# Patient Record
Sex: FEMALE | Race: WHITE | NOT HISPANIC OR LATINO | URBAN - METROPOLITAN AREA
[De-identification: names, ages, dates, MRNs, and addresses within clinical notes are randomized per-mention and may not be internally consistent; named-entity substitution may affect disease eponyms.]

---

## 2021-03-26 ENCOUNTER — OUTPATIENT (OUTPATIENT)
Dept: OUTPATIENT SERVICES | Facility: HOSPITAL | Age: 31
LOS: 1 days | Discharge: HOME | End: 2021-03-26
Payer: COMMERCIAL

## 2021-03-30 PROCEDURE — 90791 PSYCH DIAGNOSTIC EVALUATION: CPT | Mod: 95

## 2021-05-03 DIAGNOSIS — F42.2 MIXED OBSESSIONAL THOUGHTS AND ACTS: ICD-10-CM

## 2021-05-18 PROCEDURE — 99214 OFFICE O/P EST MOD 30 MIN: CPT | Mod: 95

## 2021-06-29 PROCEDURE — 99214 OFFICE O/P EST MOD 30 MIN: CPT | Mod: 95

## 2021-09-28 PROCEDURE — 99214 OFFICE O/P EST MOD 30 MIN: CPT | Mod: 95

## 2021-12-14 PROCEDURE — 90834 PSYTX W PT 45 MINUTES: CPT | Mod: 95

## 2022-01-04 PROCEDURE — 99214 OFFICE O/P EST MOD 30 MIN: CPT | Mod: 95

## 2022-01-27 ENCOUNTER — OUTPATIENT (OUTPATIENT)
Dept: OUTPATIENT SERVICES | Facility: HOSPITAL | Age: 32
LOS: 1 days | Discharge: HOME | End: 2022-01-27
Payer: COMMERCIAL

## 2022-02-03 DIAGNOSIS — F42.2 MIXED OBSESSIONAL THOUGHTS AND ACTS: ICD-10-CM

## 2023-05-23 PROBLEM — Z00.00 ENCOUNTER FOR PREVENTIVE HEALTH EXAMINATION: Status: ACTIVE | Noted: 2023-05-23

## 2023-07-31 ENCOUNTER — TRANSCRIPTION ENCOUNTER (OUTPATIENT)
Age: 33
End: 2023-07-31

## 2023-07-31 ENCOUNTER — APPOINTMENT (OUTPATIENT)
Dept: ANTEPARTUM | Facility: CLINIC | Age: 33
End: 2023-07-31
Payer: COMMERCIAL

## 2023-07-31 ENCOUNTER — ASOB RESULT (OUTPATIENT)
Age: 33
End: 2023-07-31

## 2023-07-31 PROCEDURE — 76811 OB US DETAILED SNGL FETUS: CPT

## 2023-08-30 ENCOUNTER — APPOINTMENT (OUTPATIENT)
Dept: PEDIATRIC CARDIOLOGY | Facility: CLINIC | Age: 33
End: 2023-08-30
Payer: COMMERCIAL

## 2023-08-30 PROCEDURE — 76821 MIDDLE CEREBRAL ARTERY ECHO: CPT

## 2023-08-30 PROCEDURE — 76820 UMBILICAL ARTERY ECHO: CPT

## 2023-08-30 PROCEDURE — 99202 OFFICE O/P NEW SF 15 MIN: CPT | Mod: 25

## 2023-08-30 PROCEDURE — 93325 DOPPLER ECHO COLOR FLOW MAPG: CPT | Mod: 59

## 2023-08-30 PROCEDURE — 76827 ECHO EXAM OF FETAL HEART: CPT

## 2023-08-30 PROCEDURE — 76825 ECHO EXAM OF FETAL HEART: CPT

## 2023-10-11 ENCOUNTER — APPOINTMENT (OUTPATIENT)
Dept: ANTEPARTUM | Facility: CLINIC | Age: 33
End: 2023-10-11
Payer: COMMERCIAL

## 2023-10-11 ENCOUNTER — ASOB RESULT (OUTPATIENT)
Age: 33
End: 2023-10-11

## 2023-10-11 ENCOUNTER — INPATIENT (INPATIENT)
Facility: HOSPITAL | Age: 33
LOS: 24 days | Discharge: HOME | End: 2023-11-05
Attending: OBSTETRICS & GYNECOLOGY | Admitting: OBSTETRICS & GYNECOLOGY
Payer: COMMERCIAL

## 2023-10-11 VITALS — HEART RATE: 76 BPM | SYSTOLIC BLOOD PRESSURE: 143 MMHG | DIASTOLIC BLOOD PRESSURE: 80 MMHG

## 2023-10-11 DIAGNOSIS — Z34.80 ENCOUNTER FOR SUPERVISION OF OTHER NORMAL PREGNANCY, UNSPECIFIED TRIMESTER: ICD-10-CM

## 2023-10-11 DIAGNOSIS — O26.899 OTHER SPECIFIED PREGNANCY RELATED CONDITIONS, UNSPECIFIED TRIMESTER: ICD-10-CM

## 2023-10-11 PROBLEM — Z00.00 ENCOUNTER FOR PREVENTIVE HEALTH EXAMINATION: Status: ACTIVE | Noted: 2023-10-11

## 2023-10-11 LAB
ALBUMIN SERPL ELPH-MCNC: 3.8 G/DL — SIGNIFICANT CHANGE UP (ref 3.3–5)
ALP SERPL-CCNC: 126 U/L — HIGH (ref 40–120)
ALT FLD-CCNC: 14 U/L — SIGNIFICANT CHANGE UP (ref 10–45)
ANION GAP SERPL CALC-SCNC: 12 MMOL/L — SIGNIFICANT CHANGE UP (ref 5–17)
APPEARANCE UR: ABNORMAL
APTT BLD: 26.7 SEC — SIGNIFICANT CHANGE UP (ref 24.5–35.6)
AST SERPL-CCNC: 14 U/L — SIGNIFICANT CHANGE UP (ref 10–40)
BACTERIA # UR AUTO: ABNORMAL
BASOPHILS # BLD AUTO: 0.03 K/UL — SIGNIFICANT CHANGE UP (ref 0–0.2)
BASOPHILS NFR BLD AUTO: 0.3 % — SIGNIFICANT CHANGE UP (ref 0–2)
BILIRUB SERPL-MCNC: 0.2 MG/DL — SIGNIFICANT CHANGE UP (ref 0.2–1.2)
BILIRUB UR-MCNC: NEGATIVE — SIGNIFICANT CHANGE UP
BLD GP AB SCN SERPL QL: NEGATIVE — SIGNIFICANT CHANGE UP
BUN SERPL-MCNC: 8 MG/DL — SIGNIFICANT CHANGE UP (ref 7–23)
CALCIUM SERPL-MCNC: 9.2 MG/DL — SIGNIFICANT CHANGE UP (ref 8.4–10.5)
CHLORIDE SERPL-SCNC: 101 MMOL/L — SIGNIFICANT CHANGE UP (ref 96–108)
CO2 SERPL-SCNC: 22 MMOL/L — SIGNIFICANT CHANGE UP (ref 22–31)
COLOR SPEC: SIGNIFICANT CHANGE UP
CREAT ?TM UR-MCNC: 58 MG/DL — SIGNIFICANT CHANGE UP
CREAT SERPL-MCNC: 0.49 MG/DL — LOW (ref 0.5–1.3)
DIFF PNL FLD: NEGATIVE — SIGNIFICANT CHANGE UP
EGFR: 129 ML/MIN/1.73M2 — SIGNIFICANT CHANGE UP
EOSINOPHIL # BLD AUTO: 0.1 K/UL — SIGNIFICANT CHANGE UP (ref 0–0.5)
EOSINOPHIL NFR BLD AUTO: 1 % — SIGNIFICANT CHANGE UP (ref 0–6)
EPI CELLS # UR: 4 /HPF — SIGNIFICANT CHANGE UP
FIBRINOGEN PPP-MCNC: 610 MG/DL — HIGH (ref 200–445)
GLUCOSE SERPL-MCNC: 75 MG/DL — SIGNIFICANT CHANGE UP (ref 70–99)
GLUCOSE UR QL: NEGATIVE — SIGNIFICANT CHANGE UP
HCT VFR BLD CALC: 34.9 % — SIGNIFICANT CHANGE UP (ref 34.5–45)
HGB BLD-MCNC: 11.7 G/DL — SIGNIFICANT CHANGE UP (ref 11.5–15.5)
HYALINE CASTS # UR AUTO: 2 /LPF — SIGNIFICANT CHANGE UP (ref 0–2)
IMM GRANULOCYTES NFR BLD AUTO: 0.5 % — SIGNIFICANT CHANGE UP (ref 0–0.9)
INR BLD: 0.89 RATIO — SIGNIFICANT CHANGE UP (ref 0.85–1.18)
KETONES UR-MCNC: SIGNIFICANT CHANGE UP
LDH SERPL L TO P-CCNC: 142 U/L — SIGNIFICANT CHANGE UP (ref 50–242)
LEUKOCYTE ESTERASE UR-ACNC: NEGATIVE — SIGNIFICANT CHANGE UP
LYMPHOCYTES # BLD AUTO: 1.75 K/UL — SIGNIFICANT CHANGE UP (ref 1–3.3)
LYMPHOCYTES # BLD AUTO: 17 % — SIGNIFICANT CHANGE UP (ref 13–44)
MCHC RBC-ENTMCNC: 28.6 PG — SIGNIFICANT CHANGE UP (ref 27–34)
MCHC RBC-ENTMCNC: 33.5 GM/DL — SIGNIFICANT CHANGE UP (ref 32–36)
MCV RBC AUTO: 85.3 FL — SIGNIFICANT CHANGE UP (ref 80–100)
MONOCYTES # BLD AUTO: 0.52 K/UL — SIGNIFICANT CHANGE UP (ref 0–0.9)
MONOCYTES NFR BLD AUTO: 5 % — SIGNIFICANT CHANGE UP (ref 2–14)
NEUTROPHILS # BLD AUTO: 7.86 K/UL — HIGH (ref 1.8–7.4)
NEUTROPHILS NFR BLD AUTO: 76.2 % — SIGNIFICANT CHANGE UP (ref 43–77)
NITRITE UR-MCNC: NEGATIVE — SIGNIFICANT CHANGE UP
NRBC # BLD: 0 /100 WBCS — SIGNIFICANT CHANGE UP (ref 0–0)
PH UR: 6.5 — SIGNIFICANT CHANGE UP (ref 5–8)
PLATELET # BLD AUTO: 237 K/UL — SIGNIFICANT CHANGE UP (ref 150–400)
POTASSIUM SERPL-MCNC: 4.3 MMOL/L — SIGNIFICANT CHANGE UP (ref 3.5–5.3)
POTASSIUM SERPL-SCNC: 4.3 MMOL/L — SIGNIFICANT CHANGE UP (ref 3.5–5.3)
PROT ?TM UR-MCNC: 8 MG/DL — SIGNIFICANT CHANGE UP (ref 0–12)
PROT SERPL-MCNC: 6.8 G/DL — SIGNIFICANT CHANGE UP (ref 6–8.3)
PROT UR-MCNC: NEGATIVE — SIGNIFICANT CHANGE UP
PROT/CREAT UR-RTO: 0.1 RATIO — SIGNIFICANT CHANGE UP (ref 0–0.2)
PROTHROM AB SERPL-ACNC: 9.8 SEC — SIGNIFICANT CHANGE UP (ref 9.5–13)
RBC # BLD: 4.09 M/UL — SIGNIFICANT CHANGE UP (ref 3.8–5.2)
RBC # FLD: 13.4 % — SIGNIFICANT CHANGE UP (ref 10.3–14.5)
RBC CASTS # UR COMP ASSIST: 2 /HPF — SIGNIFICANT CHANGE UP (ref 0–4)
RH IG SCN BLD-IMP: POSITIVE — SIGNIFICANT CHANGE UP
SODIUM SERPL-SCNC: 135 MMOL/L — SIGNIFICANT CHANGE UP (ref 135–145)
SP GR SPEC: 1.01 — SIGNIFICANT CHANGE UP (ref 1.01–1.02)
URATE SERPL-MCNC: 5.1 MG/DL — SIGNIFICANT CHANGE UP (ref 2.5–7)
UROBILINOGEN FLD QL: NEGATIVE — SIGNIFICANT CHANGE UP
WBC # BLD: 10.31 K/UL — SIGNIFICANT CHANGE UP (ref 3.8–10.5)
WBC # FLD AUTO: 10.31 K/UL — SIGNIFICANT CHANGE UP (ref 3.8–10.5)
WBC UR QL: 4 /HPF — SIGNIFICANT CHANGE UP (ref 0–5)

## 2023-10-11 PROCEDURE — 76805 OB US >/= 14 WKS SNGL FETUS: CPT | Mod: 26

## 2023-10-11 PROCEDURE — 93010 ELECTROCARDIOGRAM REPORT: CPT

## 2023-10-11 RX ORDER — MAGNESIUM SULFATE 500 MG/ML
2 VIAL (ML) INJECTION
Qty: 40 | Refills: 0 | Status: DISCONTINUED | OUTPATIENT
Start: 2023-10-11 | End: 2023-10-12

## 2023-10-11 RX ORDER — SERTRALINE 25 MG/1
200 TABLET, FILM COATED ORAL DAILY
Refills: 0 | Status: DISCONTINUED | OUTPATIENT
Start: 2023-10-11 | End: 2023-11-05

## 2023-10-11 RX ORDER — NIFEDIPINE 30 MG
30 TABLET, EXTENDED RELEASE 24 HR ORAL DAILY
Refills: 0 | Status: DISCONTINUED | OUTPATIENT
Start: 2023-10-11 | End: 2023-10-16

## 2023-10-11 RX ORDER — MAGNESIUM SULFATE 500 MG/ML
4 VIAL (ML) INJECTION ONCE
Refills: 0 | Status: DISCONTINUED | OUTPATIENT
Start: 2023-10-11 | End: 2023-10-12

## 2023-10-11 RX ORDER — LABETALOL HCL 100 MG
20 TABLET ORAL ONCE
Refills: 0 | Status: COMPLETED | OUTPATIENT
Start: 2023-10-11 | End: 2023-10-11

## 2023-10-11 RX ORDER — HEPARIN SODIUM 5000 [USP'U]/ML
5000 INJECTION INTRAVENOUS; SUBCUTANEOUS EVERY 12 HOURS
Refills: 0 | Status: DISCONTINUED | OUTPATIENT
Start: 2023-10-11 | End: 2023-10-12

## 2023-10-11 RX ORDER — MAGNESIUM SULFATE 500 MG/ML
4 VIAL (ML) INJECTION ONCE
Refills: 0 | Status: COMPLETED | OUTPATIENT
Start: 2023-10-11 | End: 2023-10-11

## 2023-10-11 RX ORDER — SODIUM CHLORIDE 9 MG/ML
1000 INJECTION, SOLUTION INTRAVENOUS
Refills: 0 | Status: DISCONTINUED | OUTPATIENT
Start: 2023-10-11 | End: 2023-10-13

## 2023-10-11 RX ADMIN — Medication 300 GRAM(S): at 22:51

## 2023-10-11 RX ADMIN — SODIUM CHLORIDE 50 MILLILITER(S): 9 INJECTION, SOLUTION INTRAVENOUS at 22:51

## 2023-10-11 RX ADMIN — Medication 50 GM/HR: at 23:13

## 2023-10-11 RX ADMIN — Medication 20 MILLIGRAM(S): at 22:37

## 2023-10-11 RX ADMIN — SERTRALINE 200 MILLIGRAM(S): 25 TABLET, FILM COATED ORAL at 22:17

## 2023-10-11 RX ADMIN — Medication 12 MILLIGRAM(S): at 23:39

## 2023-10-11 NOTE — OB PROVIDER H&P - ATTENDING COMMENTS
32yo  @30 weeks and 3 days presents from the office with elevated blood pressure. At work, patient noted to have an elevated blood pressure of 155/88. Patient also admits to increased pedal swelling over the past week and dyspnea on exertion x1 day. Pt. denies headaches, blurry vision, painful contractions, vaginal bleeding, or loss of fluid at this time.       BPs are mild range with non sustained severe range BPs    NST: Reactive, moderate variability, accels present, no decels  toco: no ctx    PMH: anxiety    Sono: vtx, EFW 45%    Labs normal P/C .1    A/P: Elevated BP and normal labs at 30 3/7 weeks  -MFM consult  -admit for BP monitoring  -Cards OB consult appreciated  -MgSO4 and BMZ if sustained severe range BPs-woulld then need to start antihypertensive meds    Jono Beckett MD

## 2023-10-11 NOTE — OB PROVIDER TRIAGE NOTE - HISTORY OF PRESENT ILLNESS
34 yo  @ 30 weeks and 3 days presents from the office with elevated blood pressure. At work, patient noted to have an elevated blood pressure of 155/88. Patient also admits to increased pedal swelling over the past week and dyspnea on exertion x1 day. Pt. denies headaches, blurry vision, painful contractions, vaginal bleeding, or loss of fluid at this time.   GBS: Unknown   OB: Denies previous history   GYN: Denies history of fibroids, abnormal pap smears, STDs, or ovarian cysts   Allergies: NKDA   Medical Hx: Denies history of HTN, DM, bleeding disorders, thyroid disorders   Medications: Zoloft 200mg po qd, prenatal vitamins   Surgical Hx: Pyloric stenosis (pyloromyotomy) at birth   Social Hx: Anxiety

## 2023-10-11 NOTE — OB PROVIDER TRIAGE NOTE - NSOBPROVIDERNOTE_OBGYN_ALL_OB_FT
34 yo  @30 weeks and 3 days presents to triage to r/o sPEC.   Plan:  -HELLP labs pending   -Continue to monitor EFM/TOCO  -Continue to monitor vital signs, blood pressure q15 minutes   -MFM consult    -Discussed with SHIRA Veras 34 yo  @30 weeks and 3 days presents to triage to r/o sPEC.   Plan:  -HELLP labs pending   -Continue to monitor EFM/TOCO  -Continue to monitor vital signs, blood pressure q15 minutes   -MFM consult    -EKG ordered   -Discussed with SHIRA Veras

## 2023-10-11 NOTE — OB RN PATIENT PROFILE - FALL HARM RISK - UNIVERSAL INTERVENTIONS
Bed in lowest position, wheels locked, appropriate side rails in place/Call bell, personal items and telephone in reach/Instruct patient to call for assistance before getting out of bed or chair/Non-slip footwear when patient is out of bed/[place] to call system/Physically safe environment - no spills, clutter or unnecessary equipment/Purposeful Proactive Rounding/Room/bathroom lighting operational, light cord in reach

## 2023-10-11 NOTE — OB RN PATIENT PROFILE - FUNCTIONAL ASSESSMENT - BASIC MOBILITY 6.
Not able to assess (calculate score using AMP[facility] averaging method) 4-calculated by average /Not able to assess (calculate score using [facility] averaging method)

## 2023-10-11 NOTE — OB PROVIDER TRIAGE NOTE - NSHPPHYSICALEXAM_GEN_ALL_CORE
CV: S1,S2  Pulmonary: Clear to auscultation bilaterally    Abdomen: Gravid abdomen  Extremities: Nontender to palpation bilaterally     EFM: 130hr moderate variability, +accelerations, -decelerations   TOCO: Irritability

## 2023-10-11 NOTE — OB PROVIDER H&P - ASSESSMENT
34yo  @30w3d presents from the office with elevated blood pressure being admitted to the antepartum service for blood pressure monitoring. Patient currently in stable condition, maternal + fetal status reassuring.     #Elevated BPs  - Persistently mild range BPs with one non-sustained severe range BPs  - HELLP labs reviewed       -- H/H: 11.7/34.9       -- Plt: 237       -- AST/ALT: 14/  - P/C: 0.1       []Collect 24hr urine protein while admitted  - Con't to monitor BPs    #Fetal Well Being  - NST bid  - ATU (10/11): vertex, posterior placenta, EFW 1587 (45%ile), JOSE 17.34, BPP 8/8  - Hold BMZ + MgSO4 at this time    #Maternal Wellbeing  - Con't PNVs  - Reg diet  - HSQ + SCDs for DVT ppx     D/W Dr. Beckett  [facility] PGY4 34yo  @30w3d presents from the office with elevated blood pressure being admitted to the antepartum service for blood pressure monitoring. Patient currently in stable condition, maternal + fetal status reassuring.     #Elevated BPs  - Persistently mild range BPs with one non-sustained severe range BPs  - HELLP labs reviewed       -- H/H: 11.7/34.9       -- Plt: 237       -- AST/ALT: 14/14  - P/C: 0.1       []Collect 24hr urine protein while admitted  - Con't to monitor BPs    #Dyspnea on Exertion  - SpO2 wnl  []Obtain EKG  []CardioOB consult    #Fetal Well Being  - NST bid  - ATU (10/11): vertex, posterior placenta, EFW 1587 (45%ile), JOSE 17.34, BPP 8/8  - Hold BMZ + MgSO4 at this time    #Maternal Wellbeing  - Con't PNVs  - Reg diet  - HSQ + SCDs for DVT ppx     D/W Dr. Beckett  [facility] PGY4

## 2023-10-11 NOTE — OB PROVIDER H&P - NSHPPHYSICALEXAM_GEN_ALL_CORE
Vital Signs Last 24 Hrs  T(C): 36.8 (11 Oct 2023 15:24), Max: 36.8 (11 Oct 2023 15:24)  T(F): 98.2 (11 Oct 2023 15:24), Max: 98.2 (11 Oct 2023 15:24)  HR: 78 (11 Oct 2023 17:27) (68 - 86)  BP: 154/87 (11 Oct 2023 17:26) (127/65 - 171/100)  BP(mean): --  RR: 18 (11 Oct 2023 15:24) (18 - 18)  SpO2: 95% (11 Oct 2023 17:27) (92% - 100%)    Exam:  GA: NAD  Pulm: CTAB  Cards: RRR, S1 S2 WNL  Abd: appropirately tender to palpation, BS present  LE: no edema/tenderness

## 2023-10-11 NOTE — OB PROVIDER H&P - HISTORY OF PRESENT ILLNESS
34yo  @30 weeks and 3 days presents from the office with elevated blood pressure. At work, patient noted to have an elevated blood pressure of 155/88. Patient also admits to increased pedal swelling over the past week and dyspnea on exertion x1 day. Pt. denies headaches, blurry vision, painful contractions, vaginal bleeding, or loss of fluid at this time.     OB: Primigravida   GYN: Denies history of fibroids, abnormal pap smears, STDs, or ovarian cysts   Medical Hx: Denies history of HTN, DM, bleeding disorders, thyroid disorders  Surgical Hx: Pyloric stenosis (pyloromyotomy) at birth   Medications: Zoloft 200mg PO qD, Prenatal vitamins   Allergies: NKDA  Social Hx: Anxiety

## 2023-10-11 NOTE — OB RN TRIAGE NOTE - NSLDARRIVAL_OBGYN_ALL_OB_START_DATE
Telephone Encounter by Zainab Luciano at 10/25/17 11:34 AM     Author:  Zainab Luciano Service:  (none) Author Type:  Patient      Filed:  10/25/17 11:35 AM Encounter Date:  10/24/2017 Status:  Signed     :  Zainab Luciano (Patient )            Spoke with pt.  Pt is unable to do morning appointments, as she needs to take the bus to appt's.  Requesting a work in appt in the afternoon on any day.    Please advise if OK to use frozen slot.[KL1.1M]      Revision History        User Key Date/Time User Provider Type Action    > KL1.1 10/25/17 11:35 AM Zainab Luciano Patient  Sign    M - Manual             11-Oct-2023 15:00

## 2023-10-11 NOTE — OB RN TRIAGE NOTE - NS_OBGYNHISTORY_OBGYN_ALL_OB_FT
Seema Irene (DO)  Internal Medicine  [facility] [place] [address]  Phone: [phone]  Fax: [phone]  Follow Up Time: Routine pyloric stenosis at birth Seema Irene (DO)  Internal Medicine  [address] [place] [address]  Phone: [phone]  Fax: [phone]  Follow Up Time: Routine    Ellis Vásquez)  Gastroenterology; Internal Medicine  [address] [place] [address]  Phone: [phone]  Fax: [phone]  Follow Up Time: 1 week

## 2023-10-12 LAB
ALBUMIN SERPL ELPH-MCNC: 3.7 G/DL — SIGNIFICANT CHANGE UP (ref 3.3–5)
ALBUMIN SERPL ELPH-MCNC: 3.8 G/DL — SIGNIFICANT CHANGE UP (ref 3.3–5)
ALP SERPL-CCNC: 127 U/L — HIGH (ref 40–120)
ALP SERPL-CCNC: 134 U/L — HIGH (ref 40–120)
ALT FLD-CCNC: 15 U/L — SIGNIFICANT CHANGE UP (ref 10–45)
ALT FLD-CCNC: 16 U/L — SIGNIFICANT CHANGE UP (ref 10–45)
ANION GAP SERPL CALC-SCNC: 14 MMOL/L — SIGNIFICANT CHANGE UP (ref 5–17)
ANION GAP SERPL CALC-SCNC: 16 MMOL/L — SIGNIFICANT CHANGE UP (ref 5–17)
APTT BLD: 26 SEC — SIGNIFICANT CHANGE UP (ref 24.5–35.6)
APTT BLD: 26.8 SEC — SIGNIFICANT CHANGE UP (ref 24.5–35.6)
AST SERPL-CCNC: 15 U/L — SIGNIFICANT CHANGE UP (ref 10–40)
AST SERPL-CCNC: 16 U/L — SIGNIFICANT CHANGE UP (ref 10–40)
BASOPHILS # BLD AUTO: 0.01 K/UL — SIGNIFICANT CHANGE UP (ref 0–0.2)
BASOPHILS # BLD AUTO: 0.01 K/UL — SIGNIFICANT CHANGE UP (ref 0–0.2)
BASOPHILS NFR BLD AUTO: 0.1 % — SIGNIFICANT CHANGE UP (ref 0–2)
BASOPHILS NFR BLD AUTO: 0.1 % — SIGNIFICANT CHANGE UP (ref 0–2)
BILIRUB SERPL-MCNC: 0.2 MG/DL — SIGNIFICANT CHANGE UP (ref 0.2–1.2)
BILIRUB SERPL-MCNC: 0.2 MG/DL — SIGNIFICANT CHANGE UP (ref 0.2–1.2)
BUN SERPL-MCNC: 6 MG/DL — LOW (ref 7–23)
BUN SERPL-MCNC: 7 MG/DL — SIGNIFICANT CHANGE UP (ref 7–23)
CALCIUM SERPL-MCNC: 7.9 MG/DL — LOW (ref 8.4–10.5)
CALCIUM SERPL-MCNC: 8.3 MG/DL — LOW (ref 8.4–10.5)
CHLORIDE SERPL-SCNC: 100 MMOL/L — SIGNIFICANT CHANGE UP (ref 96–108)
CHLORIDE SERPL-SCNC: 99 MMOL/L — SIGNIFICANT CHANGE UP (ref 96–108)
CO2 SERPL-SCNC: 18 MMOL/L — LOW (ref 22–31)
CO2 SERPL-SCNC: 21 MMOL/L — LOW (ref 22–31)
CREAT SERPL-MCNC: 0.43 MG/DL — LOW (ref 0.5–1.3)
CREAT SERPL-MCNC: 0.5 MG/DL — SIGNIFICANT CHANGE UP (ref 0.5–1.3)
EGFR: 129 ML/MIN/1.73M2 — SIGNIFICANT CHANGE UP
EGFR: 133 ML/MIN/1.73M2 — SIGNIFICANT CHANGE UP
EOSINOPHIL # BLD AUTO: 0 K/UL — SIGNIFICANT CHANGE UP (ref 0–0.5)
EOSINOPHIL # BLD AUTO: 0.01 K/UL — SIGNIFICANT CHANGE UP (ref 0–0.5)
EOSINOPHIL NFR BLD AUTO: 0 % — SIGNIFICANT CHANGE UP (ref 0–6)
EOSINOPHIL NFR BLD AUTO: 0.1 % — SIGNIFICANT CHANGE UP (ref 0–6)
FIBRINOGEN PPP-MCNC: 502 MG/DL — HIGH (ref 200–445)
FIBRINOGEN PPP-MCNC: 564 MG/DL — HIGH (ref 200–445)
GLUCOSE SERPL-MCNC: 108 MG/DL — HIGH (ref 70–99)
GLUCOSE SERPL-MCNC: 97 MG/DL — SIGNIFICANT CHANGE UP (ref 70–99)
HCT VFR BLD CALC: 34.1 % — LOW (ref 34.5–45)
HCT VFR BLD CALC: 34.5 % — SIGNIFICANT CHANGE UP (ref 34.5–45)
HGB BLD-MCNC: 11.5 G/DL — SIGNIFICANT CHANGE UP (ref 11.5–15.5)
HGB BLD-MCNC: 11.6 G/DL — SIGNIFICANT CHANGE UP (ref 11.5–15.5)
IMM GRANULOCYTES NFR BLD AUTO: 0.4 % — SIGNIFICANT CHANGE UP (ref 0–0.9)
IMM GRANULOCYTES NFR BLD AUTO: 0.4 % — SIGNIFICANT CHANGE UP (ref 0–0.9)
INR BLD: 0.85 RATIO — SIGNIFICANT CHANGE UP (ref 0.85–1.18)
INR BLD: 0.93 RATIO — SIGNIFICANT CHANGE UP (ref 0.85–1.18)
LDH SERPL L TO P-CCNC: 143 U/L — SIGNIFICANT CHANGE UP (ref 50–242)
LDH SERPL L TO P-CCNC: 145 U/L — SIGNIFICANT CHANGE UP (ref 50–242)
LYMPHOCYTES # BLD AUTO: 0.94 K/UL — LOW (ref 1–3.3)
LYMPHOCYTES # BLD AUTO: 0.99 K/UL — LOW (ref 1–3.3)
LYMPHOCYTES # BLD AUTO: 9.7 % — LOW (ref 13–44)
LYMPHOCYTES # BLD AUTO: 9.8 % — LOW (ref 13–44)
MAGNESIUM SERPL-MCNC: 4.7 MG/DL — HIGH (ref 1.6–2.6)
MAGNESIUM SERPL-MCNC: 5.2 MG/DL — HIGH (ref 1.6–2.6)
MAGNESIUM SERPL-MCNC: 5.2 MG/DL — HIGH (ref 1.6–2.6)
MCHC RBC-ENTMCNC: 28.5 PG — SIGNIFICANT CHANGE UP (ref 27–34)
MCHC RBC-ENTMCNC: 28.6 PG — SIGNIFICANT CHANGE UP (ref 27–34)
MCHC RBC-ENTMCNC: 33.6 GM/DL — SIGNIFICANT CHANGE UP (ref 32–36)
MCHC RBC-ENTMCNC: 33.7 GM/DL — SIGNIFICANT CHANGE UP (ref 32–36)
MCV RBC AUTO: 84.6 FL — SIGNIFICANT CHANGE UP (ref 80–100)
MCV RBC AUTO: 85 FL — SIGNIFICANT CHANGE UP (ref 80–100)
MONOCYTES # BLD AUTO: 0.12 K/UL — SIGNIFICANT CHANGE UP (ref 0–0.9)
MONOCYTES # BLD AUTO: 0.45 K/UL — SIGNIFICANT CHANGE UP (ref 0–0.9)
MONOCYTES NFR BLD AUTO: 1.2 % — LOW (ref 2–14)
MONOCYTES NFR BLD AUTO: 4.4 % — SIGNIFICANT CHANGE UP (ref 2–14)
NEUTROPHILS # BLD AUTO: 8.5 K/UL — HIGH (ref 1.8–7.4)
NEUTROPHILS # BLD AUTO: 8.7 K/UL — HIGH (ref 1.8–7.4)
NEUTROPHILS NFR BLD AUTO: 85.4 % — HIGH (ref 43–77)
NEUTROPHILS NFR BLD AUTO: 88.4 % — HIGH (ref 43–77)
NRBC # BLD: 0 /100 WBCS — SIGNIFICANT CHANGE UP (ref 0–0)
NRBC # BLD: 0 /100 WBCS — SIGNIFICANT CHANGE UP (ref 0–0)
PLATELET # BLD AUTO: 242 K/UL — SIGNIFICANT CHANGE UP (ref 150–400)
PLATELET # BLD AUTO: 261 K/UL — SIGNIFICANT CHANGE UP (ref 150–400)
POTASSIUM SERPL-MCNC: 4.1 MMOL/L — SIGNIFICANT CHANGE UP (ref 3.5–5.3)
POTASSIUM SERPL-MCNC: 4.2 MMOL/L — SIGNIFICANT CHANGE UP (ref 3.5–5.3)
POTASSIUM SERPL-SCNC: 4.1 MMOL/L — SIGNIFICANT CHANGE UP (ref 3.5–5.3)
POTASSIUM SERPL-SCNC: 4.2 MMOL/L — SIGNIFICANT CHANGE UP (ref 3.5–5.3)
PROT SERPL-MCNC: 6.7 G/DL — SIGNIFICANT CHANGE UP (ref 6–8.3)
PROT SERPL-MCNC: 6.9 G/DL — SIGNIFICANT CHANGE UP (ref 6–8.3)
PROTHROM AB SERPL-ACNC: 9.4 SEC — LOW (ref 9.5–13)
PROTHROM AB SERPL-ACNC: 9.8 SEC — SIGNIFICANT CHANGE UP (ref 9.5–13)
RBC # BLD: 4.03 M/UL — SIGNIFICANT CHANGE UP (ref 3.8–5.2)
RBC # BLD: 4.06 M/UL — SIGNIFICANT CHANGE UP (ref 3.8–5.2)
RBC # FLD: 13.4 % — SIGNIFICANT CHANGE UP (ref 10.3–14.5)
RBC # FLD: 13.5 % — SIGNIFICANT CHANGE UP (ref 10.3–14.5)
SODIUM SERPL-SCNC: 134 MMOL/L — LOW (ref 135–145)
SODIUM SERPL-SCNC: 134 MMOL/L — LOW (ref 135–145)
T PALLIDUM AB TITR SER: NEGATIVE — SIGNIFICANT CHANGE UP
URATE SERPL-MCNC: 5 MG/DL — SIGNIFICANT CHANGE UP (ref 2.5–7)
URATE SERPL-MCNC: 5.6 MG/DL — SIGNIFICANT CHANGE UP (ref 2.5–7)
WBC # BLD: 10.19 K/UL — SIGNIFICANT CHANGE UP (ref 3.8–10.5)
WBC # BLD: 9.62 K/UL — SIGNIFICANT CHANGE UP (ref 3.8–10.5)
WBC # FLD AUTO: 10.19 K/UL — SIGNIFICANT CHANGE UP (ref 3.8–10.5)
WBC # FLD AUTO: 9.62 K/UL — SIGNIFICANT CHANGE UP (ref 3.8–10.5)

## 2023-10-12 PROCEDURE — 99253 IP/OBS CNSLTJ NEW/EST LOW 45: CPT | Mod: GC

## 2023-10-12 PROCEDURE — 93306 TTE W/DOPPLER COMPLETE: CPT | Mod: 26

## 2023-10-12 PROCEDURE — 99252 IP/OBS CONSLTJ NEW/EST SF 35: CPT

## 2023-10-12 RX ORDER — METOCLOPRAMIDE HCL 10 MG
10 TABLET ORAL ONCE
Refills: 0 | Status: COMPLETED | OUTPATIENT
Start: 2023-10-12 | End: 2023-10-12

## 2023-10-12 RX ORDER — HEPARIN SODIUM 5000 [USP'U]/ML
5000 INJECTION INTRAVENOUS; SUBCUTANEOUS EVERY 12 HOURS
Refills: 0 | Status: DISCONTINUED | OUTPATIENT
Start: 2023-10-12 | End: 2023-11-01

## 2023-10-12 RX ORDER — ACETAMINOPHEN 500 MG
975 TABLET ORAL ONCE
Refills: 0 | Status: COMPLETED | OUTPATIENT
Start: 2023-10-12 | End: 2023-10-12

## 2023-10-12 RX ORDER — ACETAMINOPHEN 500 MG
650 TABLET ORAL ONCE
Refills: 0 | Status: DISCONTINUED | OUTPATIENT
Start: 2023-10-12 | End: 2023-10-12

## 2023-10-12 RX ORDER — ACETAMINOPHEN 500 MG
1000 TABLET ORAL ONCE
Refills: 0 | Status: COMPLETED | OUTPATIENT
Start: 2023-10-12 | End: 2023-10-12

## 2023-10-12 RX ORDER — DIPHENHYDRAMINE HCL 50 MG
25 CAPSULE ORAL ONCE
Refills: 0 | Status: COMPLETED | OUTPATIENT
Start: 2023-10-12 | End: 2023-10-12

## 2023-10-12 RX ADMIN — Medication 30 MILLIGRAM(S): at 00:06

## 2023-10-12 RX ADMIN — Medication 30 MILLIGRAM(S): at 23:29

## 2023-10-12 RX ADMIN — Medication 975 MILLIGRAM(S): at 15:35

## 2023-10-12 RX ADMIN — Medication 25 MILLIGRAM(S): at 17:04

## 2023-10-12 RX ADMIN — Medication 975 MILLIGRAM(S): at 08:27

## 2023-10-12 RX ADMIN — SERTRALINE 200 MILLIGRAM(S): 25 TABLET, FILM COATED ORAL at 22:12

## 2023-10-12 RX ADMIN — Medication 400 MILLIGRAM(S): at 02:18

## 2023-10-12 RX ADMIN — Medication 12 MILLIGRAM(S): at 23:31

## 2023-10-12 RX ADMIN — HEPARIN SODIUM 5000 UNIT(S): 5000 INJECTION INTRAVENOUS; SUBCUTANEOUS at 18:08

## 2023-10-12 RX ADMIN — Medication 10 MILLIGRAM(S): at 17:07

## 2023-10-12 RX ADMIN — HEPARIN SODIUM 5000 UNIT(S): 5000 INJECTION INTRAVENOUS; SUBCUTANEOUS at 03:43

## 2023-10-12 NOTE — CONSULT NOTE ADULT - ASSESSMENT
34 yo F,  30w3d presents for elevated BP in pregnancy. Cardiology consulted for dyspnea on exertion for the past two weeks. EKG, initial blood work, history, physical exam, and normal oxygen saturation on room air are reassuring that her symptom is likely not cardiac in origin. In the setting of her elevated BPs and leg swelling, it is reasonable to obtain an echocardiogram.    Recommendations:  - Obtain TTE  - Can check pro-BNP, note this may be elevated in pregnancy anyway  - BP control per OB team     ***Note not finalized until co-signed by attending***     Thiago Loaiza MD  Cardiology Fellow  All Cardiology service information can be found  on [email or web address], password: [email or web address] 
34yo  at 30w4d admitted for preeclampsia with severe features.  s/p Labetalol 20mg IVP for severe range blood pressures last night; BPs have been 130s-140s/80s since starting magnesium gtt and Procardia 30mg XL daily.  Low clinical concern for pulmonary edema given VS and exam findings and no other maternal or fetal indications for delivery at this time.  NST reactive, fetal status is reassuring.    - Recommend in house management until delivery with close monitoring of symptoms, blood pressure, and HELLP labs  - OK to discontinue magnesium given stabilized  - Continue Procardia 30mg XL daily, titrate if BPs increasing/consistently mild range  - Will follow up 24h urine protein   - Appreciate recs from cardioOB, will follow up BNP and TTE  - NSTs BID   - Continue betamethasone course for fetal lung maturity (first dose given  11:30pm)  - Delivery at 34 weeks unless uncontrolled severe range blood pressures, unremitting severe symptoms, worsening lab abnormalities/HELLP, fetal distress, s/sx abruption  - Restart magnesium for seizure prophylaxis (and fetal neuroprotection <32w) if concern for delivery    Seen with Dr. Pulliam, MFM attending  Conuselo Carrillo MD PGY-5 MFM fellow

## 2023-10-12 NOTE — CONSULT NOTE ADULT - SUBJECTIVE AND OBJECTIVE BOX
MFM Fellow Consult Note    S: Patient seen and examined at bedside. No acute complaints.  She reports over the last few weeks, she has had progressive dyspnea on exertion, noting it has become more difficult to climb the stairs at her work.  No current shortness of breath at rest.  She also denies headache, chest pain, RUQ pain, N/V, epigastric pain.   No personal history of HTN disorders.        HISTORIES  OB: G1  Gyn: denies  PMH: Anxiety  PSH: pyloric stenosis repair DOL2  Meds: Zoloft 200mg daily, PNV  ALL: NKDA  Soc: no substance use  FHx: HTN in mother and father      Vital Signs Last 24 Hours  T(C): 36.7 (10-12-23 @ 13:50), Max: 36.9 (10-12-23 @ 01:36)  HR: 97 (10-12-23 @ 14:12) (65 - 114)  BP: 147/86 (10-12-23 @ 14:02) (111/59 - 186/101)  RR: 18 (10-12-23 @ 01:36) (18 - 18)  SpO2: 94% (10-12-23 @ 14:12) (92% - 100%)      Physical Exam:  General: NAD  Cardiac: regular rate and rhythm  Pulmonary: clear to auscultation bilaterally  Abdomen: Soft, non-tender, gravid; no RUQ tenderness  Ext: Minimal edema, equal bilaterally, nontender LE        Labs:             11.5   9.62  )-----------( 242      ( 10-12 @ 06:02 )             34.1     10-12 @ 05:56    134  |  100  |  7   ----------------------------<  108  4.2   |  18  |  0.43    Ca    8.3      10-12 @ 05:56  Mg     5.2     10-12 @ 12:45    TPro  6.9  /  Alb  3.8  /  TBili  0.2  /  DBili  x   /  AST  16  /  ALT  16  /  AlkPhos  134  10-12 @ 05:56    PT/INR - ( 10-12 @ 06:02 )   PT: 9.4 sec;   INR: 0.85 ratio    PTT - ( 10-12 @ 06:02 )  PTT:26.8 sec    Uric Acid: (10-12 @ 05:56)  5.0      LDH: (10-12 @ 05:56)  145      UP:C 0.1       MEDICATIONS  (STANDING):  betamethasone Injectable 12 milliGRAM(s) IntraMuscular daily  lactated ringers. 1000 milliLiter(s) (50 mL/Hr) IV Continuous <Continuous>  magnesium sulfate  IVPB 4 Gram(s) IV Intermittent once  NIFEdipine XL 30 milliGRAM(s) Oral daily  prenatal multivitamin 1 Tablet(s) Oral daily  sertraline 200 milliGRAM(s) Oral daily      Sono (10/11)  1587g (45%ile), BPP 8/8, cephalic      NST: Baseline 125bpm, moderate variability, accelerations present, no decelerations  
Thiago Loaiza MD  Cardiology Fellow  All Cardiology service information can be found  on [email or web address], password: [email or web address]    Patient seen and evaluated at bedside    Chief Complaint:    HPI:  32yo  @30 weeks and 3 days presents from the office with elevated blood pressure. At work, patient noted to have an elevated blood pressure of 155/88. Patient also admits to increased pedal swelling over the past week and dyspnea on exertion x1 day. Pt. denies headaches, blurry vision, painful contractions, vaginal bleeding, or loss of fluid at this time.     OB: Primigravida   GYN: Denies history of fibroids, abnormal pap smears, STDs, or ovarian cysts   Medical Hx: Denies history of HTN, DM, bleeding disorders, thyroid disorders  Surgical Hx: Pyloric stenosis (pyloromyotomy) at birth   Medications: Zoloft 200mg PO qD, Prenatal vitamins   Allergies: NKDA  Social Hx: Anxiety  (11 Oct 2023 17:21)      Patient confirms the history above.  34 yo  F with PMHx anxiety presenting with mild dyspnea on exertion. She reports that she has been feeling mildly dyspneic on exertion for two weeks, particularly with going up stairs. She reports that she walked around a pumpkin patch all day this past weekend and did not feel short of breath. She states that the shortness of breath is noticeable but does not cause her to have to stop to rest or sit down. This is not associated with chest pain, leg pain, or palpitations. She denies chest pain with deep breathing. She also endorses leg swelling for about the past week. She has had an uneventful pregnancy and has no cardiac history or medical history besides anxiety.     She was sent into the hospital to be evaluated for high blood pressures when her BP was 155/88 at work.    PMHx:   Anxiety    PSHx:   Pyloromyotomy at birth    Allergies:  No Known Allergies    Home Medications:  Zoloft 200mg QD  Prenatal vitamin QD    Current Medications:   betamethasone Injectable 12 milliGRAM(s) IntraMuscular daily  heparin   Injectable 5000 Unit(s) SubCutaneous every 12 hours  prenatal multivitamin 1 Tablet(s) Oral daily      FAMILY HISTORY:  Grandfather had DM and CAD with CABG at a late age  No other family members with cardiac disease    Social History:  Smoking History: Denies  Alcohol Use: Denies during pregnancy. Occasional prior to pregnancy  Drug Use:     REVIEW OF SYSTEMS:  CONSTITUTIONAL: No weakness, fevers or chills  RESPIRATORY: No cough, wheezing, hemoptysis; +shortness of breath. No orthopnea, no pleurisy  CARDIOVASCULAR: No chest pain. Occasional palpitations not associated with SOB; +lower extremity edema  GASTROINTESTINAL: No abdominal or epigastric pain. No nausea, vomiting, or hematemesis  All other review of systems is negative unless indicated above.    Physical Exam:  T(F): 98.2 (10-11), Max: 98.2 (10-11)  HR: 75 (10-11) (68 - 95)  BP: 153/87 (10-11) (127/65 - 186/101)  RR: 18 (10-11)  SpO2: 98% (10-11)  GENERAL: No acute distress, well-developed  HEAD:  Atraumatic, Normocephalic  ENT: EOMI, PERRLA, conjunctiva and sclera clear, Neck supple, + elevated JVP, moist mucosa  CHEST/LUNG: Clear to auscultation bilaterally; No wheeze, equal breath sounds bilaterally. Breathing comfortably on room air.   HEART: Regular rate and rhythm; No murmurs, rubs, or gallops  ABDOMEN: Gravid  EXTREMITIES:  1+ bilateral lower extremity edema   PSYCH: Nl behavior, nl affect  NEUROLOGY: AAOx3, non-focal, cranial nerves intact  SKIN: Normal color, No rashes or lesions    LINES:    Cardiovascular Diagnostic Testing:    ECG: Personally reviewed:  Normal sinus rhythm, HR 75      Echo: Personally reviewed:    Stress Testing:    Cath:    Imaging:    CXR: Personally reviewed    Labs: Personally reviewed                        11.7   10.31 )-----------( 237      ( 11 Oct 2023 16:00 )             34.9     10    135  |  101  |  8   ----------------------------<  75  4.3   |  22  |  0.49<L>    Ca    9.2      11 Oct 2023 16:00    TPro  6.8  /  Alb  3.8  /  TBili  0.2  /  DBili  x   /  AST  14  /  ALT  14  /  AlkPhos  126<H>  10-11    PT/INR - ( 11 Oct 2023 16:00 )   PT: 9.8 sec;   INR: 0.89 ratio         PTT - ( 11 Oct 2023 16:00 )  PTT:26.7 sec

## 2023-10-12 NOTE — CONSULT NOTE ADULT - ATTENDING COMMENTS
32 yo F,  30w3d presents for elevated BP in pregnancy.   Cardiology consulted for dyspnea on exertion for the past two weeks.     - EKG with no active ischemic features  - awaiting TTE  - monitor BP closelu and keep <140/90  - pBNP is pending
MFM Attending    Pt evaluated and counseled today with the MFM team.  Agree with the assessment and plan above.  In summary Ms. Carrillo is a 32yo G1 @ 30 4/7 weeks HD 2 s/p admission for preeclampsia with severe features by blood pressures.  She required one IVP labetalol 20mg for sustained severe hypertension and was started on nifedipine 30mg XL.  She was also initiated on magnesium for seizure prophylaxis/fetal neurological benefit and started an  corticosteroid series.  Today she reported improvement of dyspnea though cardiac workup is ongoing.  No symptoms of preeclampsia, blood pressure has been stable in the mildly elevated range and HELLP labs have remained normal.  EFW yesterday in the normal range, and fetal testing has also been normal.  As Ms. Carrillo is an appropriate candidate for expectant management of severe preeclampsia, we recommended discontinuation of magnesium and transfer to the antepartum service.  Goal is expectant management in the hospital until 34 weeks, unless there is a change in maternal or fetal status.  At this time fetus is vertex and induction of labor would be reasonable.

## 2023-10-12 NOTE — PROGRESS NOTE ADULT - ASSESSMENT
32yo  @30w4d admitted for blood pressure monitoring, overnight met criteria for sPEC by severe range BPs. Maternal and fetal status stable at this time.     #Elevated BPs  - S/p Lab 20 IVP (10/12 ~12a)   - C/w Procardia 30 QD  - HELLP labs reviewed, wnl  - P/C: 0.1, Collect 24hr urine protein  - Con't to monitor BPs    #Dyspnea on Exertion  - SpO2 wnl  - EKG wnl   - Appreciate CardioOB recs:  symptom is likely not cardiac in origin, obtain TTE, check proBNP     #Fetal Well Being  - BMZ for fetal lung maturity(10/12-)  - Continuous monitoring while on magnesium   - ATU (10/11): vertex, posterior placenta, EFW 1587 (45%ile), JOSE 17.34, BPP     #Maternal Wellbeing  - Con't PNVs  - clear liquid diet   - SCDs for DVT ppx (holding HSQ while on L&D)     SPollo Suarez-Wilder PGY3

## 2023-10-12 NOTE — PROGRESS NOTE ADULT - SUBJECTIVE AND OBJECTIVE BOX
Antepartum Note, HD#2    Interval events:    Patient seen and examined at bedside, pt met criteria for sPEC by severe BPs las tnight, was started on magnesium.   No acute complaints. Pt reports +FM, denies LOF, VB, ctx. Reports mild dull HA, denies epigastric pain, blurred vision, CP, SOB, N/V, fevers, and chills.    Vital Signs Last 24 Hours  T(C): 36.9 (10-12-23 @ 06:47), Max: 36.9 (10-12-23 @ 01:36)  HR: 96 (10-12-23 @ 07:34) (65 - 99)  BP: 133/84 (10-12-23 @ 07:32) (111/59 - 186/101)  RR: 18 (10-12-23 @ 01:36) (18 - 18)  SpO2: 97% (10-12-23 @ 07:34) (92% - 100%)      Physical Exam:  General: NAD  Abdomen: Soft, non-tender, gravid  Ext: No pain or swelling    NST reactive overnight    Labs:             11.5   9.62  )-----------( 242      ( 10-12 @ 06:02 )             34.1     10-12 @ 05:56    134  |  100  |  7   ----------------------------<  108  4.2   |  18  |  0.43    Ca    8.3      10-12 @ 05:56  Mg     4.7     10-12 @ 05:56    TPro  6.9  /  Alb  3.8  /  TBili  0.2  /  DBili  x   /  AST  16  /  ALT  16  /  AlkPhos  134  10-12 @ 05:56    PT/INR - ( 10-12 @ 06:02 )   PT: 9.4 sec;   INR: 0.85 ratio    PTT - ( 10-12 @ 06:02 )  PTT:26.8 sec    Uric Acid: (10-12 @ 05:56)  5.0      Fibrinogen: (10-12 @ 05:56)  --       LDH: (10-12 @ 05:56)  145        MEDICATIONS  (STANDING):  betamethasone Injectable 12 milliGRAM(s) IntraMuscular daily  heparin   Injectable 5000 Unit(s) SubCutaneous every 12 hours  lactated ringers. 1000 milliLiter(s) (50 mL/Hr) IV Continuous <Continuous>  magnesium sulfate  IVPB 4 Gram(s) IV Intermittent once  magnesium sulfate Infusion 2 Gm/Hr (50 mL/Hr) IV Continuous <Continuous>  magnesium sulfate Infusion 2 Gm/Hr (50 mL/Hr) IV Continuous <Continuous>  NIFEdipine XL 30 milliGRAM(s) Oral daily  prenatal multivitamin 1 Tablet(s) Oral daily  sertraline 200 milliGRAM(s) Oral daily    MEDICATIONS  (PRN):

## 2023-10-13 ENCOUNTER — ASOB RESULT (OUTPATIENT)
Age: 33
End: 2023-10-13

## 2023-10-13 ENCOUNTER — APPOINTMENT (OUTPATIENT)
Dept: ANTEPARTUM | Facility: CLINIC | Age: 33
End: 2023-10-13

## 2023-10-13 LAB
COLLECT DURATION TIME UR: 24 HR — SIGNIFICANT CHANGE UP
GROUP B BETA STREP DNA (PCR): SIGNIFICANT CHANGE UP
PROT 24H UR-MRATE: 352 MG/24 H — HIGH (ref 50–100)
SOURCE GROUP B STREP: SIGNIFICANT CHANGE UP
TOTAL VOLUME - 24 HOUR: 3200 ML — SIGNIFICANT CHANGE UP
URINE CREATININE CALCULATION: 1.3 G/24 H — SIGNIFICANT CHANGE UP (ref 0.8–1.8)

## 2023-10-13 PROCEDURE — 99231 SBSQ HOSP IP/OBS SF/LOW 25: CPT

## 2023-10-13 RX ADMIN — HEPARIN SODIUM 5000 UNIT(S): 5000 INJECTION INTRAVENOUS; SUBCUTANEOUS at 05:48

## 2023-10-13 RX ADMIN — Medication 1 TABLET(S): at 21:52

## 2023-10-13 RX ADMIN — SERTRALINE 200 MILLIGRAM(S): 25 TABLET, FILM COATED ORAL at 21:53

## 2023-10-13 RX ADMIN — Medication 30 MILLIGRAM(S): at 05:45

## 2023-10-13 RX ADMIN — HEPARIN SODIUM 5000 UNIT(S): 5000 INJECTION INTRAVENOUS; SUBCUTANEOUS at 17:31

## 2023-10-13 NOTE — PROGRESS NOTE ADULT - SUBJECTIVE AND OBJECTIVE BOX
MFM Fellow Consult Follow Up Note    S: Patient feeling well today.  Yesterday, patient had mild headache and episode of emesis after first meal since discontinuing magnesium; pt reports complete resolution of symptoms today.  Also denies changes in vision, CP, SOB, epigastric/RUQ pain.  No contractions, leakage of fluid, vaginal bleeding.  Good fetal movement.     Vital Signs Last 24 Hours  T(C): 36.9 (10-13-23 @ 09:04), Max: 36.9 (10-12-23 @ 16:00)  HR: 75 (10-13-23 @ 09:04) (70 - 114)  BP: 141/81 (10-13-23 @ 09:04) (130/71 - 156/90)  RR: 18 (10-13-23 @ 09:04) (18 - 18)  SpO2: 97% (10-13-23 @ 09:04) (93% - 98%)    Physical Exam:  General: NAD  Ext: No edema    NST Baseline 130, moderate variability, accelerations, no decelerations  TOCO: no contractions    Labs:             11.6   10.19 )-----------( 261      ( 10-12 @ 17:20 )             34.5     10-12 @ 17:19    134  |  99  |  6   ----------------------------<  97  4.1   |  21  |  0.50    Ca    7.9      10-12 @ 17:19  Mg     5.2     10-12 @ 12:45    TPro  6.7  /  Alb  3.7  /  TBili  0.2  /  DBili  x   /  AST  15  /  ALT  15  /  AlkPhos  127  10-12 @ 17:19    PT/INR - ( 10-12 @ 17:20 )   PT: 9.8 sec;   INR: 0.93 ratio    PTT - ( 10-12 @ 17:20 )  PTT:26.0 sec    Uric Acid: (10-12 @ 17:19)  5.6      Fibrinogen: (10-12 @ 17:19)  --       LDH: (10-12 @ 17:19)  143        24h Urine protein 352      MEDICATIONS  (STANDING):  heparin   Injectable 5000 Unit(s) SubCutaneous every 12 hours  NIFEdipine XL 30 milliGRAM(s) Oral daily  prenatal multivitamin 1 Tablet(s) Oral daily  sertraline 200 milliGRAM(s) Oral daily

## 2023-10-13 NOTE — PROGRESS NOTE ADULT - SUBJECTIVE AND OBJECTIVE BOX
Antepartum Note, HD#3    Interval events:    Patient seen and examined at bedside, no acute overnight events. No acute complaints. Pt reports +FM, denies LOF, VB, ctx, HA, epigastric pain, blurred vision, CP, SOB, N/V, fevers, and chills.    Vital Signs Last 24 Hours  T(C): 36.6 (10-13-23 @ 05:50), Max: 36.9 (10-12-23 @ 08:00)  HR: 70 (10-13-23 @ 05:50) (70 - 114)  BP: 131/65 (10-13-23 @ 05:50) (127/80 - 156/90)  RR: 18 (10-13-23 @ 05:50) (18 - 18)  SpO2: 97% (10-13-23 @ 05:50) (93% - 98%)    Physical Exam:  General: NAD  Abdomen: Soft, non-tender, gravid  Ext: No pain or swelling    NST reactive overnight    Labs:             11.6   10.19 )-----------( 261      ( 10-12 @ 17:20 )             34.5     10-12 @ 17:19    134  |  99  |  6   ----------------------------<  97  4.1   |  21  |  0.50    Ca    7.9      10-12 @ 17:19  Mg     5.2     10-12 @ 12:45    TPro  6.7  /  Alb  3.7  /  TBili  0.2  /  DBili  x   /  AST  15  /  ALT  15  /  AlkPhos  127  10-12 @ 17:19    PT/INR - ( 10-12 @ 17:20 )   PT: 9.8 sec;   INR: 0.93 ratio    PTT - ( 10-12 @ 17:20 )  PTT:26.0 sec    Uric Acid: (10-12 @ 17:19)  5.6      Fibrinogen: (10-12 @ 17:19)  --       LDH: (10-12 @ 17:19)  143        MEDICATIONS  (STANDING):  heparin   Injectable 5000 Unit(s) SubCutaneous every 12 hours  lactated ringers. 1000 milliLiter(s) (50 mL/Hr) IV Continuous <Continuous>  NIFEdipine XL 30 milliGRAM(s) Oral daily  sertraline 200 milliGRAM(s) Oral daily    MEDICATIONS  (PRN):

## 2023-10-13 NOTE — PROGRESS NOTE ADULT - ATTENDING COMMENTS
34yo  @30w5d admitted sPEC by severe range BPs. Pt is s/p magnesium gtt x24 hours for seizure prophylaxis. BPs remain well controlled on procardia 30 mg XL daily. HELLP labs wnl. MFM and Cardio OB following. 24 hour urine pending. Pt received betamethasone course 10/11- for fetal well-being. NST BID. Plan at this time is delivery at 34 weeks.

## 2023-10-13 NOTE — PROGRESS NOTE ADULT - ASSESSMENT
34yo  at 30w5d admitted for preeclampsia with severe features. Currently asymptomatic and BPs controlled on Procardia 30mg XL daily.  Repeat labs performed last night in the setting of symptoms stable and wnl.  TTE reviewed, wnl.  Fetus AGA (10/11) NST reactive this AM, fetal status is reassuring. s/p BMTZ (10/11-10/12).      - Recommend in house management until delivery with close monitoring of symptoms, blood pressure, and HELLP labs  - Continue Procardia 30mg XL daily, titrate if BPs increasing/consistently mild range  - Continue NSTs BID and twice weekly BPPs   - Delivery at 34 weeks unless uncontrolled severe range blood pressures, unremitting severe symptoms, worsening lab abnormalities/HELLP, fetal distress, s/sx abruption  - Restart magnesium for seizure prophylaxis (and fetal neuroprotection <32w) if concern for delivery  - Will return for BPP today     Seen with PEGGY BoyleM attending  Consuelo Carrillo MD PGY-5 MFM fellow

## 2023-10-13 NOTE — PROGRESS NOTE ADULT - ATTENDING COMMENTS
MFM Attending    Pt evaluated and counseled on MFM rounds today.  Agree with the assessment and plan of Dr. Carrillo.    In summary Ms. Carrillo is a 34yo G1 @ 30 5/7 weeks HD 3 s/p admission for preeclampsia with severe features by blood pressures.  She is s/p magnesium for seizure prophylaxis and will be steroid complete today.  Her blood pressures appear to be in the target range on nifedipine 30mg XL qd.  The plan is for her to remain admitted until delivery at 34 weeks.  Will follow BPs and titrate medication accordingly.  HELLP labs every three days with T&S.  Fetal monitoring with twice daily NSTs and twice weekly BPPs.  For magnesium during induction and for 24h postpartum.

## 2023-10-13 NOTE — PROGRESS NOTE ADULT - ASSESSMENT
32yo  @30w5d admitted sPEC by severe range BPs. Maternal and fetal status stable overnight.     #Elevated BPs  - S/p Lab 20 IVP (10/12 ~12a)   - C/w Procardia 30 QD  - HELLP labs reviewed, wnl  - P/C: 0.1, F/u 24hr urine protein  - Con't to monitor BPs    #Dyspnea on Exertion  - SpO2 wnl  - EKG wnl  - TTE  wnl   - Appreciate CardioOB recs:  symptom is likely not cardiac in origin     #Fetal Well Being  - s/p BMZ for fetal lung maturity(10/11-)  - NST BID  - fetus is AGA   - ATU (10/11): vertex, posterior placenta, EFW 1587 (45%ile), JOSE 17.34, BPP     #Maternal Wellbeing  - Con't PNVs  - Reg diet   - HSQ/SCDs for DVT ppx    S. Suarez-Wilder PGY3

## 2023-10-14 PROCEDURE — 99231 SBSQ HOSP IP/OBS SF/LOW 25: CPT

## 2023-10-14 RX ADMIN — Medication 30 MILLIGRAM(S): at 05:26

## 2023-10-14 RX ADMIN — HEPARIN SODIUM 5000 UNIT(S): 5000 INJECTION INTRAVENOUS; SUBCUTANEOUS at 05:26

## 2023-10-14 RX ADMIN — SERTRALINE 200 MILLIGRAM(S): 25 TABLET, FILM COATED ORAL at 21:24

## 2023-10-14 RX ADMIN — HEPARIN SODIUM 5000 UNIT(S): 5000 INJECTION INTRAVENOUS; SUBCUTANEOUS at 21:24

## 2023-10-14 RX ADMIN — Medication 1 TABLET(S): at 21:24

## 2023-10-14 NOTE — PROGRESS NOTE ADULT - SUBJECTIVE AND OBJECTIVE BOX
R3 Antepartum Note, HD#4    Patient seen and examined at bedside, no acute overnight events. No acute complaints. Pt reports +FM, denies LOF, VB, ctx, HA, epigastric pain, blurred vision, CP, SOB, N/V, fevers, and chills.    Vital Signs Last 24 Hours  T(C): 36.6 (10-14-23 @ 00:07), Max: 37.1 (10-13-23 @ 17:06)  HR: 76 (10-14-23 @ 00:07) (70 - 84)  BP: 136/77 (10-14-23 @ 00:07) (126/72 - 144/78)  RR: 18 (10-14-23 @ 00:07) (18 - 18)  SpO2: 95% (10-14-23 @ 00:07) (95% - 97%)    CAPILLARY BLOOD GLUCOSE          Physical Exam:  General: NAD  Abdomen: Soft, non-tender, gravid  Ext: No pain or swelling    Labs:             11.6   10.19 )-----------( 261      ( 10-12 @ 17:20 )             34.5     10-12 @ 17:19    134  |  99  |  6   ----------------------------<  97  4.1   |  21  |  0.50    Ca    7.9      10-12 @ 17:19  Mg     5.2     10-12 @ 12:45    TPro  6.7  /  Alb  3.7  /  TBili  0.2  /  DBili  x   /  AST  15  /  ALT  15  /  AlkPhos  127  10-12 @ 17:19    PT/INR - ( 10-12 @ 17:20 )   PT: 9.8 sec;   INR: 0.93 ratio    PTT - ( 10-12 @ 17:20 )  PTT:26.0 sec    Uric Acid: (10-12 @ 17:19)  5.6      Fibrinogen: (10-12 @ 17:19)  --       LDH: (10-12 @ 17:19)  143        MEDICATIONS  (STANDING):  heparin   Injectable 5000 Unit(s) SubCutaneous every 12 hours  NIFEdipine XL 30 milliGRAM(s) Oral daily  prenatal multivitamin 1 Tablet(s) Oral daily  sertraline 200 milliGRAM(s) Oral daily    MEDICATIONS  (PRN):

## 2023-10-14 NOTE — PROGRESS NOTE ADULT - ATTENDING COMMENTS
OB attending     Patient seen and examined, agreee with above  NST reactive from this AM - has good FM   BPs well controlled on current regimen     Vital Signs Last 24 Hrs  T(C): 36.6 (14 Oct 2023 08:34), Max: 37.1 (13 Oct 2023 17:06)  T(F): 97.8 (14 Oct 2023 08:34), Max: 98.8 (13 Oct 2023 17:06)  HR: 69 (14 Oct 2023 08:34) (67 - 84)  BP: 132/75 (14 Oct 2023 08:34) (126/72 - 144/78)  BP(mean): --  RR: 18 (14 Oct 2023 08:34) (18 - 18)  SpO2: 96% (14 Oct 2023 08:34) (95% - 97%)    Parameters below as of 14 Oct 2023 08:34  Patient On (Oxygen Delivery Method): room air      A/P 34yo  @30w6d admitted for sPEC by severe range BPs. Maternal and fetal status stable overnight.   -CONTINUE current regimen procardia 30xL  -continue inpatient expectant mgmt to 34 weeks    Maryjo Johnson MD

## 2023-10-14 NOTE — PROGRESS NOTE ADULT - ASSESSMENT
32yo  @30w6d admitted for sPEC by severe range BPs. Maternal and fetal status stable overnight.     #sPEC  - criteria met by severe range BPs   - S/p Lab 20 IVP (10/12 ~12a)   - C/w Procardia 30 QD  - BPs ON 130s-140s/70s   - HELLP labs reviewed, wnl  - 24h urine protein = 352  - Con't to monitor BPs    #Dyspnea on Exertion  - SpO2 wnl  - EKG wnl  - TTE  wnl   - Appreciate CardioOB recs:  symptom is likely not cardiac in origin     #Fetal Well Being  - s/p BMZ for fetal lung maturity(10/11-)  - NST BID  - fetus is AGA   - ATU(10/13):vtx, post, JOSE 16, BPP 8/8  - ATU (10/11): vertex, posterior placenta, EFW 1587 (45%ile), JOSE 17.34, BPP 8/8    #Maternal Wellbeing  - Con't PNVs  - Reg diet   - HSQ/SCDs for DVT ppx    DONNA Neely PGY3

## 2023-10-14 NOTE — PROGRESS NOTE ADULT - SUBJECTIVE AND OBJECTIVE BOX
Pt reports feeling well.  Denies PEC sxs.      BPs in the target range.    FHRT is reactive today.

## 2023-10-14 NOTE — PROGRESS NOTE ADULT - ASSESSMENT
34yo G1 @ 30 6/7 weeks HD 4 s/p admission for preeclampsia with severe features by blood pressures.      She is s/p magnesium for seizure prophylaxis and she is steroid complete (10/13).  Her blood pressures are controlled on nifedipine 30mg XL qd.       Will follow BPs and titrate medication accordingly.  HELLP labs every three days with T&S.  Fetal monitoring with twice daily NSTs and twice weekly BPPs.      The plan is for her to remain admitted until delivery at 34 weeks.  For magnesium during induction and for 24h postpartum.

## 2023-10-15 LAB
ALBUMIN SERPL ELPH-MCNC: 3.7 G/DL — SIGNIFICANT CHANGE UP (ref 3.3–5)
ALP SERPL-CCNC: 125 U/L — HIGH (ref 40–120)
ALT FLD-CCNC: 16 U/L — SIGNIFICANT CHANGE UP (ref 10–45)
ANION GAP SERPL CALC-SCNC: 14 MMOL/L — SIGNIFICANT CHANGE UP (ref 5–17)
APTT BLD: 26.2 SEC — SIGNIFICANT CHANGE UP (ref 24.5–35.6)
AST SERPL-CCNC: 16 U/L — SIGNIFICANT CHANGE UP (ref 10–40)
BASOPHILS # BLD AUTO: 0.04 K/UL — SIGNIFICANT CHANGE UP (ref 0–0.2)
BASOPHILS NFR BLD AUTO: 0.4 % — SIGNIFICANT CHANGE UP (ref 0–2)
BILIRUB SERPL-MCNC: 0.1 MG/DL — LOW (ref 0.2–1.2)
BLD GP AB SCN SERPL QL: NEGATIVE — SIGNIFICANT CHANGE UP
BUN SERPL-MCNC: 10 MG/DL — SIGNIFICANT CHANGE UP (ref 7–23)
CALCIUM SERPL-MCNC: 9.1 MG/DL — SIGNIFICANT CHANGE UP (ref 8.4–10.5)
CHLORIDE SERPL-SCNC: 102 MMOL/L — SIGNIFICANT CHANGE UP (ref 96–108)
CO2 SERPL-SCNC: 19 MMOL/L — LOW (ref 22–31)
CREAT SERPL-MCNC: 0.42 MG/DL — LOW (ref 0.5–1.3)
EGFR: 134 ML/MIN/1.73M2 — SIGNIFICANT CHANGE UP
EOSINOPHIL # BLD AUTO: 0.07 K/UL — SIGNIFICANT CHANGE UP (ref 0–0.5)
EOSINOPHIL NFR BLD AUTO: 0.8 % — SIGNIFICANT CHANGE UP (ref 0–6)
FIBRINOGEN PPP-MCNC: 479 MG/DL — HIGH (ref 200–445)
GLUCOSE SERPL-MCNC: 118 MG/DL — HIGH (ref 70–99)
HCT VFR BLD CALC: 35.2 % — SIGNIFICANT CHANGE UP (ref 34.5–45)
HGB BLD-MCNC: 11.6 G/DL — SIGNIFICANT CHANGE UP (ref 11.5–15.5)
IMM GRANULOCYTES NFR BLD AUTO: 1.1 % — HIGH (ref 0–0.9)
INR BLD: 0.87 RATIO — SIGNIFICANT CHANGE UP (ref 0.85–1.18)
LDH SERPL L TO P-CCNC: 141 U/L — SIGNIFICANT CHANGE UP (ref 50–242)
LYMPHOCYTES # BLD AUTO: 1.99 K/UL — SIGNIFICANT CHANGE UP (ref 1–3.3)
LYMPHOCYTES # BLD AUTO: 22.1 % — SIGNIFICANT CHANGE UP (ref 13–44)
MCHC RBC-ENTMCNC: 28.3 PG — SIGNIFICANT CHANGE UP (ref 27–34)
MCHC RBC-ENTMCNC: 33 GM/DL — SIGNIFICANT CHANGE UP (ref 32–36)
MCV RBC AUTO: 85.9 FL — SIGNIFICANT CHANGE UP (ref 80–100)
MONOCYTES # BLD AUTO: 0.61 K/UL — SIGNIFICANT CHANGE UP (ref 0–0.9)
MONOCYTES NFR BLD AUTO: 6.8 % — SIGNIFICANT CHANGE UP (ref 2–14)
NEUTROPHILS # BLD AUTO: 6.19 K/UL — SIGNIFICANT CHANGE UP (ref 1.8–7.4)
NEUTROPHILS NFR BLD AUTO: 68.8 % — SIGNIFICANT CHANGE UP (ref 43–77)
NRBC # BLD: 0 /100 WBCS — SIGNIFICANT CHANGE UP (ref 0–0)
PLATELET # BLD AUTO: 240 K/UL — SIGNIFICANT CHANGE UP (ref 150–400)
POTASSIUM SERPL-MCNC: 3.6 MMOL/L — SIGNIFICANT CHANGE UP (ref 3.5–5.3)
POTASSIUM SERPL-SCNC: 3.6 MMOL/L — SIGNIFICANT CHANGE UP (ref 3.5–5.3)
PROT SERPL-MCNC: 6.8 G/DL — SIGNIFICANT CHANGE UP (ref 6–8.3)
PROTHROM AB SERPL-ACNC: 9.6 SEC — SIGNIFICANT CHANGE UP (ref 9.5–13)
RBC # BLD: 4.1 M/UL — SIGNIFICANT CHANGE UP (ref 3.8–5.2)
RBC # FLD: 13.2 % — SIGNIFICANT CHANGE UP (ref 10.3–14.5)
RH IG SCN BLD-IMP: POSITIVE — SIGNIFICANT CHANGE UP
SODIUM SERPL-SCNC: 135 MMOL/L — SIGNIFICANT CHANGE UP (ref 135–145)
URATE SERPL-MCNC: 3.8 MG/DL — SIGNIFICANT CHANGE UP (ref 2.5–7)
WBC # BLD: 9 K/UL — SIGNIFICANT CHANGE UP (ref 3.8–10.5)
WBC # FLD AUTO: 9 K/UL — SIGNIFICANT CHANGE UP (ref 3.8–10.5)

## 2023-10-15 RX ADMIN — Medication 30 MILLIGRAM(S): at 05:41

## 2023-10-15 RX ADMIN — SERTRALINE 200 MILLIGRAM(S): 25 TABLET, FILM COATED ORAL at 21:56

## 2023-10-15 RX ADMIN — Medication 1 TABLET(S): at 21:56

## 2023-10-15 RX ADMIN — HEPARIN SODIUM 5000 UNIT(S): 5000 INJECTION INTRAVENOUS; SUBCUTANEOUS at 21:56

## 2023-10-15 RX ADMIN — HEPARIN SODIUM 5000 UNIT(S): 5000 INJECTION INTRAVENOUS; SUBCUTANEOUS at 08:58

## 2023-10-15 NOTE — PROGRESS NOTE ADULT - SUBJECTIVE AND OBJECTIVE BOX
R3 Antepartum Note    Patient seen and examined at bedside, no acute overnight events. No acute complaints. Pt reports +FM, denies LOF, VB, ctx, HA, epigastric pain, blurred vision, CP, SOB, N/V, fevers, and chills. Feels well.    Vital Signs Last 24 Hours  T(C): 36.5 (10-14-23 @ 23:43), Max: 36.7 (10-14-23 @ 17:14)  HR: 72 (10-14-23 @ 23:43) (67 - 73)  BP: 144/79 (10-14-23 @ 23:43) (132/75 - 144/79)  RR: 18 (10-14-23 @ 23:43) (18 - 18)  SpO2: 96% (10-14-23 @ 23:43) (95% - 97%)    CAPILLARY BLOOD GLUCOSE    Physical Exam:  General: NAD  Abdomen: Soft, non-tender, gravid  Ext: No pain or swelling    NST reactive overnight    Labs:    PT/INR - ( 10-12 @ 17:20 )   PT: 9.8 sec;   INR: 0.93 ratio    PTT - ( 10-12 @ 17:20 )  PTT:26.0 sec    Uric Acid: (10-12 @ 17:19)  5.6      Fibrinogen: (10-12 @ 17:19)  --       LDH: (10-12 @ 17:19)  143        MEDICATIONS  (STANDING):  heparin   Injectable 5000 Unit(s) SubCutaneous every 12 hours  NIFEdipine XL 30 milliGRAM(s) Oral daily  prenatal multivitamin 1 Tablet(s) Oral daily  sertraline 200 milliGRAM(s) Oral daily    MEDICATIONS  (PRN):

## 2023-10-15 NOTE — PROGRESS NOTE ADULT - ASSESSMENT
34yo  @ 31w0d admitted for sPEC by severe range BPs. Maternal and fetal status stable overnight.     #sPEC  - criteria met by severe range BPs   - S/p Lab 20 IVP (10/12 ~12a)   - C/w Procardia 30 QD  - BPs overnight 130s-140s/70s-80s  - HELLP labs reviewed, wnl  - 24h urine protein = 352  - Con't to monitor BPs    #Dyspnea on Exertion  - SpO2 wnl  - EKG wnl  - TTE  wnl  - Appreciate CardioOB recs:  symptom is likely not cardiac in origin     #Fetal Well Being  - s/p BMZ for fetal lung maturity (10/11-)  - NST BID  - Fetus AGA   - ATU (10/13): vtx, post, JOSE 16, BPP 8/8  - ATU (10/11): vtx, posterior placenta, EFW 1587 (45%ile), JOSE 17.34, BPP 8/8    #Maternal Wellbeing  - Con't PNVs  - Reg diet   - HSQ/SCDs for DVT ppx    Helen Sheu  PGY3

## 2023-10-15 NOTE — PROGRESS NOTE ADULT - ASSESSMENT
32yo G1 @ 31 0/7 weeks HD  5 s/p admission for preeclampsia with severe features by blood pressures.      She is s/p magnesium for seizure prophylaxis and she is steroid complete (10/13).  Her blood pressures are controlled on nifedipine 30mg XL qd.      Will follow BPs and titrate medication accordingly.  HELLP labs every three days with T&S.  Fetal monitoring with twice daily NSTs and twice weekly BPPs.      The plan is for her to remain admitted until delivery at 34 weeks.  For magnesium during induction and for 24h postpartum.

## 2023-10-15 NOTE — PROGRESS NOTE ADULT - SUBJECTIVE AND OBJECTIVE BOX
Pt reports feeling well.  Denies PEC sxs.    BPs in the 130-140/80-90s.  The remainder of her vitals are normal.    FHRT is reactive.  HELLP labs today wnl.

## 2023-10-15 NOTE — PROGRESS NOTE ADULT - ATTENDING COMMENTS
OB attending    Pt seen and examined, agree with above.   BPs persistently in mild range, will discuss increase of procardia to 60mg xl daily with MfM     Vital Signs Last 24 Hrs  T(C): 36.6 (15 Oct 2023 08:48), Max: 36.7 (14 Oct 2023 17:14)  T(F): 97.9 (15 Oct 2023 08:48), Max: 98 (14 Oct 2023 17:14)  HR: 75 (15 Oct 2023 08:48) (66 - 75)  BP: 142/94 (15 Oct 2023 08:48) (132/78 - 149/84)  BP(mean): --  RR: 18 (15 Oct 2023 08:48) (18 - 18)  SpO2: 97% (15 Oct 2023 08:48) (95% - 97%)    Parameters below as of 15 Oct 2023 08:48  Patient On (Oxygen Delivery Method): room air    NST reactive        A/P: 34yo  @ 31w0d admitted for sPEC by severe range BPs. Maternal and fetal status stable overnight.   BPs in mild range, consider inc with MFM  continue inpatient mgmt until 34 weeks.     Maryjo Johnson MD OB attending    Pt seen and examined, agree with above.     Vital Signs Last 24 Hrs  T(C): 36.6 (15 Oct 2023 08:48), Max: 36.7 (14 Oct 2023 17:14)  T(F): 97.9 (15 Oct 2023 08:48), Max: 98 (14 Oct 2023 17:14)  HR: 75 (15 Oct 2023 08:48) (66 - 75)  BP: 142/94 (15 Oct 2023 08:48) (132/78 - 149/84)  BP(mean): --  RR: 18 (15 Oct 2023 08:48) (18 - 18)  SpO2: 97% (15 Oct 2023 08:48) (95% - 97%)    Parameters below as of 15 Oct 2023 08:48  Patient On (Oxygen Delivery Method): room air    NST reactive        A/P: 34yo  @ 31w0d admitted for sPEC by severe range BPs. Maternal and fetal status stable overnight.   BPs in mild range, goal to keep BP in normal to low mild range, currently at target.  Will continue to monitor.  continue inpatient mgmt until 34 weeks.     Maryjo Johnson MD

## 2023-10-16 ENCOUNTER — ASOB RESULT (OUTPATIENT)
Age: 33
End: 2023-10-16

## 2023-10-16 ENCOUNTER — APPOINTMENT (OUTPATIENT)
Dept: ANTEPARTUM | Facility: CLINIC | Age: 33
End: 2023-10-16
Payer: COMMERCIAL

## 2023-10-16 PROCEDURE — 76819 FETAL BIOPHYS PROFIL W/O NST: CPT | Mod: 26

## 2023-10-16 RX ORDER — NIFEDIPINE 30 MG
30 TABLET, EXTENDED RELEASE 24 HR ORAL ONCE
Refills: 0 | Status: COMPLETED | OUTPATIENT
Start: 2023-10-16 | End: 2023-10-16

## 2023-10-16 RX ORDER — NIFEDIPINE 30 MG
60 TABLET, EXTENDED RELEASE 24 HR ORAL DAILY
Refills: 0 | Status: DISCONTINUED | OUTPATIENT
Start: 2023-10-16 | End: 2023-11-05

## 2023-10-16 RX ADMIN — Medication 30 MILLIGRAM(S): at 05:55

## 2023-10-16 RX ADMIN — HEPARIN SODIUM 5000 UNIT(S): 5000 INJECTION INTRAVENOUS; SUBCUTANEOUS at 22:22

## 2023-10-16 RX ADMIN — Medication 1 TABLET(S): at 22:21

## 2023-10-16 RX ADMIN — HEPARIN SODIUM 5000 UNIT(S): 5000 INJECTION INTRAVENOUS; SUBCUTANEOUS at 09:08

## 2023-10-16 RX ADMIN — Medication 30 MILLIGRAM(S): at 09:02

## 2023-10-16 RX ADMIN — SERTRALINE 200 MILLIGRAM(S): 25 TABLET, FILM COATED ORAL at 22:21

## 2023-10-16 NOTE — PROGRESS NOTE ADULT - SUBJECTIVE AND OBJECTIVE BOX
R3 Antepartum Note    Patient seen and examined at bedside, no acute overnight events. Sleeping soundly and easily awoken. No acute complaints. Pt reports +FM, denies LOF, VB, ctx, HA, epigastric pain, blurred vision, CP, SOB, N/V, fevers, and chills.    Vital Signs Last 24 Hours  T(C): 36.4 (10-16-23 @ 05:30), Max: 36.7 (10-15-23 @ 13:07)  HR: 67 (10-16-23 @ 05:30) (67 - 84)  BP: 131/85 (10-16-23 @ 05:30) (126/79 - 160/92)  RR: 18 (10-16-23 @ 05:30) (18 - 18)  SpO2: 99% (10-16-23 @ 05:30) (97% - 99%)    Physical Exam:  General: NAD  Abdomen: Soft, non-tender, gravid  Ext: No pain or swelling    NST reactive overnight    Labs:             11.6   9.00  )-----------( 240      ( 10-15 @ 15:17 )             35.2     10-15 @ 15:17    135  |  102  |  10  ----------------------------<  118  3.6   |  19  |  0.42    Ca    9.1      10-15 @ 15:17    TPro  6.8  /  Alb  3.7  /  TBili  0.1  /  DBili  x   /  AST  16  /  ALT  16  /  AlkPhos  125  10-15 @ 15:17    PT/INR - ( 10-15 @ 15:17 )   PT: 9.6 sec;   INR: 0.87 ratio    PTT - ( 10-15 @ 15:17 )  PTT:26.2 sec    Uric Acid: (10-15 @ 15:17)  3.8      Fibrinogen: (10-15 @ 15:17)  --       LDH: (10-15 @ 15:17)  141      MEDICATIONS  (STANDING):  heparin   Injectable 5000 Unit(s) SubCutaneous every 12 hours  NIFEdipine XL 30 milliGRAM(s) Oral daily  prenatal multivitamin 1 Tablet(s) Oral daily  sertraline 200 milliGRAM(s) Oral daily

## 2023-10-16 NOTE — PROVIDER CONTACT NOTE (OTHER) - ASSESSMENT
Pt is asymptomatic; BP r/p within 5 minutes.  Dr. Yin notified; r/p BP in 10 minutes and it came down. Pt is asymptomatic at this time and denies visual changes, headaches, epigastric pain; BP repeated within 5 minutes.  Dr. Yin notified; once again repeated BP in 10 minutes and it came down. See vital signs flow sheet.

## 2023-10-16 NOTE — PROVIDER CONTACT NOTE (OTHER) - SITUATION
Pt had two elevated BP.  First one was severe, second was still elevated. Pt had two elevated BP within 5 minutes.

## 2023-10-16 NOTE — PROVIDER CONTACT NOTE (OTHER) - BACKGROUND
Pt is a 31 week antepartum PEC with severe features.  Pt is on procardia 30 daily. Pt is scheduled to deliver at 34 weeks. hx of anxiety Pt is a 31 week antepartum wt PEC with severe features.  She is on procardia 30 daily. Pt is scheduled to deliver at 34 weeks. hx of anxiety

## 2023-10-16 NOTE — PROGRESS NOTE ADULT - ATTENDING COMMENTS
PT SITTING UP IN BED. NO C/O.  PT HAD SEVERE RANGE BP x1 OVERNIGHT & 1 SEVERE RANGE NOW.   RN INSTRUCTED TO GIVE 2ND DOSE PROCARDIA 30XL . THEN START PROCARDIA 60 MG XL QD  NST REACTIVE. PREECLAMPSIA DISCUSSED.  QUIQUE FERREIRA

## 2023-10-16 NOTE — PROVIDER CONTACT NOTE (OTHER) - RECOMMENDATIONS
Educated the pt on s/s to look out for; contact MD Educated patient on s/s to look out for; contact MD with BP reading

## 2023-10-16 NOTE — PROGRESS NOTE ADULT - ASSESSMENT
32yo  @ 31w1d admitted for sPEC by severe range BPs. Patient with unsustained severe-range BP overnight downtrended to mild-range, asymptomatic. Currently in stable condition    #sPEC  - criteria met by severe range BPs   - S/p Lab 20 IVP (10/12 ~12a)   - C/w Procardia 30 QD  - BPs overnight w/ unsustained severe of 160/92; otherwise 120s-130s/80s-90s  - HELLP labs reviewed, wnl  - 24h urine protein = 352  - Con't to monitor BPs; consider uptitration of BP medication    #Dyspnea on Exertion  - SpO2 wnl  - EKG wnl  - TTE  wnl  - Appreciate CardioOB recs:  symptom is likely not cardiac in origin     #Fetal Well Being  - s/p BMZ for fetal lung maturity (10/11-)  - NST BID  - Fetus AGA   - ATU (10/13): vtx, post, JOSE 16, BPP 8/8  - ATU (10/11): vtx, posterior placenta, EFW 1587 (45%ile), JOSE 17.34, BPP     #Maternal Wellbeing  - Con't PNVs  - Reg diet   - HSQ/SCDs for DVT ppx    Helen Sheu  PGY3 Clothing

## 2023-10-17 LAB
ALBUMIN SERPL ELPH-MCNC: 3.6 G/DL — SIGNIFICANT CHANGE UP (ref 3.3–5)
ALBUMIN SERPL ELPH-MCNC: 3.6 G/DL — SIGNIFICANT CHANGE UP (ref 3.3–5)
ALP SERPL-CCNC: 128 U/L — HIGH (ref 40–120)
ALP SERPL-CCNC: 128 U/L — HIGH (ref 40–120)
ALT FLD-CCNC: 18 U/L — SIGNIFICANT CHANGE UP (ref 10–45)
ALT FLD-CCNC: 18 U/L — SIGNIFICANT CHANGE UP (ref 10–45)
ANION GAP SERPL CALC-SCNC: 13 MMOL/L — SIGNIFICANT CHANGE UP (ref 5–17)
ANION GAP SERPL CALC-SCNC: 13 MMOL/L — SIGNIFICANT CHANGE UP (ref 5–17)
APTT BLD: 26.3 SEC — SIGNIFICANT CHANGE UP (ref 24.5–35.6)
APTT BLD: 26.3 SEC — SIGNIFICANT CHANGE UP (ref 24.5–35.6)
AST SERPL-CCNC: 16 U/L — SIGNIFICANT CHANGE UP (ref 10–40)
AST SERPL-CCNC: 16 U/L — SIGNIFICANT CHANGE UP (ref 10–40)
BASOPHILS # BLD AUTO: 0.05 K/UL — SIGNIFICANT CHANGE UP (ref 0–0.2)
BASOPHILS # BLD AUTO: 0.05 K/UL — SIGNIFICANT CHANGE UP (ref 0–0.2)
BASOPHILS NFR BLD AUTO: 0.5 % — SIGNIFICANT CHANGE UP (ref 0–2)
BASOPHILS NFR BLD AUTO: 0.5 % — SIGNIFICANT CHANGE UP (ref 0–2)
BILIRUB SERPL-MCNC: 0.1 MG/DL — LOW (ref 0.2–1.2)
BILIRUB SERPL-MCNC: 0.1 MG/DL — LOW (ref 0.2–1.2)
BUN SERPL-MCNC: 9 MG/DL — SIGNIFICANT CHANGE UP (ref 7–23)
BUN SERPL-MCNC: 9 MG/DL — SIGNIFICANT CHANGE UP (ref 7–23)
CALCIUM SERPL-MCNC: 9.4 MG/DL — SIGNIFICANT CHANGE UP (ref 8.4–10.5)
CALCIUM SERPL-MCNC: 9.4 MG/DL — SIGNIFICANT CHANGE UP (ref 8.4–10.5)
CHLORIDE SERPL-SCNC: 103 MMOL/L — SIGNIFICANT CHANGE UP (ref 96–108)
CHLORIDE SERPL-SCNC: 103 MMOL/L — SIGNIFICANT CHANGE UP (ref 96–108)
CO2 SERPL-SCNC: 22 MMOL/L — SIGNIFICANT CHANGE UP (ref 22–31)
CO2 SERPL-SCNC: 22 MMOL/L — SIGNIFICANT CHANGE UP (ref 22–31)
CREAT SERPL-MCNC: 0.47 MG/DL — LOW (ref 0.5–1.3)
CREAT SERPL-MCNC: 0.47 MG/DL — LOW (ref 0.5–1.3)
EGFR: 130 ML/MIN/1.73M2 — SIGNIFICANT CHANGE UP
EGFR: 130 ML/MIN/1.73M2 — SIGNIFICANT CHANGE UP
EOSINOPHIL # BLD AUTO: 0.17 K/UL — SIGNIFICANT CHANGE UP (ref 0–0.5)
EOSINOPHIL # BLD AUTO: 0.17 K/UL — SIGNIFICANT CHANGE UP (ref 0–0.5)
EOSINOPHIL NFR BLD AUTO: 1.8 % — SIGNIFICANT CHANGE UP (ref 0–6)
EOSINOPHIL NFR BLD AUTO: 1.8 % — SIGNIFICANT CHANGE UP (ref 0–6)
FIBRINOGEN PPP-MCNC: 553 MG/DL — HIGH (ref 200–445)
FIBRINOGEN PPP-MCNC: 553 MG/DL — HIGH (ref 200–445)
GLUCOSE SERPL-MCNC: 83 MG/DL — SIGNIFICANT CHANGE UP (ref 70–99)
GLUCOSE SERPL-MCNC: 83 MG/DL — SIGNIFICANT CHANGE UP (ref 70–99)
HCT VFR BLD CALC: 37.7 % — SIGNIFICANT CHANGE UP (ref 34.5–45)
HCT VFR BLD CALC: 37.7 % — SIGNIFICANT CHANGE UP (ref 34.5–45)
HGB BLD-MCNC: 12.4 G/DL — SIGNIFICANT CHANGE UP (ref 11.5–15.5)
HGB BLD-MCNC: 12.4 G/DL — SIGNIFICANT CHANGE UP (ref 11.5–15.5)
IMM GRANULOCYTES NFR BLD AUTO: 1.3 % — HIGH (ref 0–0.9)
IMM GRANULOCYTES NFR BLD AUTO: 1.3 % — HIGH (ref 0–0.9)
INR BLD: 0.94 RATIO — SIGNIFICANT CHANGE UP (ref 0.85–1.18)
INR BLD: 0.94 RATIO — SIGNIFICANT CHANGE UP (ref 0.85–1.18)
LDH SERPL L TO P-CCNC: 125 U/L — SIGNIFICANT CHANGE UP (ref 50–242)
LDH SERPL L TO P-CCNC: 125 U/L — SIGNIFICANT CHANGE UP (ref 50–242)
LYMPHOCYTES # BLD AUTO: 2.38 K/UL — SIGNIFICANT CHANGE UP (ref 1–3.3)
LYMPHOCYTES # BLD AUTO: 2.38 K/UL — SIGNIFICANT CHANGE UP (ref 1–3.3)
LYMPHOCYTES # BLD AUTO: 25.6 % — SIGNIFICANT CHANGE UP (ref 13–44)
LYMPHOCYTES # BLD AUTO: 25.6 % — SIGNIFICANT CHANGE UP (ref 13–44)
MCHC RBC-ENTMCNC: 28 PG — SIGNIFICANT CHANGE UP (ref 27–34)
MCHC RBC-ENTMCNC: 28 PG — SIGNIFICANT CHANGE UP (ref 27–34)
MCHC RBC-ENTMCNC: 32.9 GM/DL — SIGNIFICANT CHANGE UP (ref 32–36)
MCHC RBC-ENTMCNC: 32.9 GM/DL — SIGNIFICANT CHANGE UP (ref 32–36)
MCV RBC AUTO: 85.1 FL — SIGNIFICANT CHANGE UP (ref 80–100)
MCV RBC AUTO: 85.1 FL — SIGNIFICANT CHANGE UP (ref 80–100)
MONOCYTES # BLD AUTO: 0.61 K/UL — SIGNIFICANT CHANGE UP (ref 0–0.9)
MONOCYTES # BLD AUTO: 0.61 K/UL — SIGNIFICANT CHANGE UP (ref 0–0.9)
MONOCYTES NFR BLD AUTO: 6.6 % — SIGNIFICANT CHANGE UP (ref 2–14)
MONOCYTES NFR BLD AUTO: 6.6 % — SIGNIFICANT CHANGE UP (ref 2–14)
NEUTROPHILS # BLD AUTO: 5.98 K/UL — SIGNIFICANT CHANGE UP (ref 1.8–7.4)
NEUTROPHILS # BLD AUTO: 5.98 K/UL — SIGNIFICANT CHANGE UP (ref 1.8–7.4)
NEUTROPHILS NFR BLD AUTO: 64.2 % — SIGNIFICANT CHANGE UP (ref 43–77)
NEUTROPHILS NFR BLD AUTO: 64.2 % — SIGNIFICANT CHANGE UP (ref 43–77)
NRBC # BLD: 0 /100 WBCS — SIGNIFICANT CHANGE UP (ref 0–0)
NRBC # BLD: 0 /100 WBCS — SIGNIFICANT CHANGE UP (ref 0–0)
PLATELET # BLD AUTO: 254 K/UL — SIGNIFICANT CHANGE UP (ref 150–400)
PLATELET # BLD AUTO: 254 K/UL — SIGNIFICANT CHANGE UP (ref 150–400)
POTASSIUM SERPL-MCNC: 4.2 MMOL/L — SIGNIFICANT CHANGE UP (ref 3.5–5.3)
POTASSIUM SERPL-MCNC: 4.2 MMOL/L — SIGNIFICANT CHANGE UP (ref 3.5–5.3)
POTASSIUM SERPL-SCNC: 4.2 MMOL/L — SIGNIFICANT CHANGE UP (ref 3.5–5.3)
POTASSIUM SERPL-SCNC: 4.2 MMOL/L — SIGNIFICANT CHANGE UP (ref 3.5–5.3)
PROT SERPL-MCNC: 6.6 G/DL — SIGNIFICANT CHANGE UP (ref 6–8.3)
PROT SERPL-MCNC: 6.6 G/DL — SIGNIFICANT CHANGE UP (ref 6–8.3)
PROTHROM AB SERPL-ACNC: 9.9 SEC — SIGNIFICANT CHANGE UP (ref 9.5–13)
PROTHROM AB SERPL-ACNC: 9.9 SEC — SIGNIFICANT CHANGE UP (ref 9.5–13)
RBC # BLD: 4.43 M/UL — SIGNIFICANT CHANGE UP (ref 3.8–5.2)
RBC # BLD: 4.43 M/UL — SIGNIFICANT CHANGE UP (ref 3.8–5.2)
RBC # FLD: 13.3 % — SIGNIFICANT CHANGE UP (ref 10.3–14.5)
RBC # FLD: 13.3 % — SIGNIFICANT CHANGE UP (ref 10.3–14.5)
SODIUM SERPL-SCNC: 138 MMOL/L — SIGNIFICANT CHANGE UP (ref 135–145)
SODIUM SERPL-SCNC: 138 MMOL/L — SIGNIFICANT CHANGE UP (ref 135–145)
URATE SERPL-MCNC: 4.9 MG/DL — SIGNIFICANT CHANGE UP (ref 2.5–7)
URATE SERPL-MCNC: 4.9 MG/DL — SIGNIFICANT CHANGE UP (ref 2.5–7)
WBC # BLD: 9.31 K/UL — SIGNIFICANT CHANGE UP (ref 3.8–10.5)
WBC # BLD: 9.31 K/UL — SIGNIFICANT CHANGE UP (ref 3.8–10.5)
WBC # FLD AUTO: 9.31 K/UL — SIGNIFICANT CHANGE UP (ref 3.8–10.5)
WBC # FLD AUTO: 9.31 K/UL — SIGNIFICANT CHANGE UP (ref 3.8–10.5)

## 2023-10-17 RX ADMIN — SERTRALINE 200 MILLIGRAM(S): 25 TABLET, FILM COATED ORAL at 20:43

## 2023-10-17 RX ADMIN — HEPARIN SODIUM 5000 UNIT(S): 5000 INJECTION INTRAVENOUS; SUBCUTANEOUS at 09:18

## 2023-10-17 RX ADMIN — HEPARIN SODIUM 5000 UNIT(S): 5000 INJECTION INTRAVENOUS; SUBCUTANEOUS at 20:45

## 2023-10-17 RX ADMIN — Medication 60 MILLIGRAM(S): at 06:03

## 2023-10-17 RX ADMIN — Medication 1 TABLET(S): at 21:09

## 2023-10-17 NOTE — PROGRESS NOTE ADULT - SUBJECTIVE AND OBJECTIVE BOX
R3 Antepartum Note    Patient seen and examined at bedside, no acute overnight events. No acute complaints. Pt reports +FM, denies LOF, VB, ctx, HA, epigastric pain, blurred vision, CP, SOB, N/V, fevers, and chills. Patient feels well.    Vital Signs Last 24 Hours  T(C): 36.3 (10-17-23 @ 05:56), Max: 36.8 (10-16-23 @ 13:54)  HR: 76 (10-17-23 @ 05:56) (71 - 92)  BP: 134/87 (10-17-23 @ 05:56) (125/78 - 164/113)  RR: 18 (10-17-23 @ 05:56) (16 - 18)  SpO2: 96% (10-17-23 @ 05:56) (96% - 98%)    CAPILLARY BLOOD GLUCOSE      Physical Exam:  General: NAD  Abdomen: Soft, non-tender, gravid  Ext: No pain or swelling    NST reactive overnight    Labs:             11.6   9.00  )-----------( 240      ( 10-15 @ 15:17 )             35.2     10-15 @ 15:17    135  |  102  |  10  ----------------------------<  118  3.6   |  19  |  0.42    Ca    9.1      10-15 @ 15:17    TPro  6.8  /  Alb  3.7  /  TBili  0.1  /  DBili  x   /  AST  16  /  ALT  16  /  AlkPhos  125  10-15 @ 15:17    PT/INR - ( 10-15 @ 15:17 )   PT: 9.6 sec;   INR: 0.87 ratio    PTT - ( 10-15 @ 15:17 )  PTT:26.2 sec    Uric Acid: (10-15 @ 15:17)  3.8      Fibrinogen: (10-15 @ 15:17)  --       LDH: (10-15 @ 15:17)  141      MEDICATIONS  (STANDING):  heparin   Injectable 5000 Unit(s) SubCutaneous every 12 hours  NIFEdipine XL 60 milliGRAM(s) Oral daily  prenatal multivitamin 1 Tablet(s) Oral daily  sertraline 200 milliGRAM(s) Oral daily

## 2023-10-17 NOTE — PROGRESS NOTE ADULT - ASSESSMENT
34yo  @ 31w2d admitted for sPEC by severe range BPs. Yesterday PM Procardia increased to 60XL. Currently in stable condition.    #sPEC  - criteria met by severe range BPs   - s/p Lab 20 IVP (10/12 ~12a)   - C/w Procardia 60 QD  - BP's overnight 120s-130s/70s-80s  - HELLP labs reviewed, wnl  - 24h urine protein = 352    #Dyspnea on Exertion  - SpO2 wnl  - EKG wnl  - TTE  wnl  - Appreciate CardioOB recs:  symptom is likely not cardiac in origin     #Fetal Well Being  - s/p BMZ for fetal lung maturity (10/11-)  - NST BID  - Fetus AGA  - ATU (10/16): vtx, posterior, JOSE 16.22, BPP 8/8  - ATU (10/13): vtx, post, JOSE 16, BPP   - ATU (10/11): vtx, posterior placenta, EFW 1587 (45%ile), JOSE 17.34, BPP     #Maternal Wellbeing  - Con't PNVs  - Reg diet   - HSQ/SCDs for DVT ppx    Helen Sheu  PGY3 32yo  @ 31w2d admitted for sPEC by severe range BPs. Yesterday PM Procardia increased to 60XL. Currently in stable condition.    #sPEC  - criteria met by severe range BPs   - s/p Lab 20 IVP (10/12 ~12a)   - C/w Procardia 60 QD  - BP's overnight 120s-130s/70s-80s  - HELLP labs reviewed, wnl  - 24h urine protein = 352    #Dyspnea on Exertion  - SpO2 wnl  - EKG wnl  - TTE wnl  - Appreciate CardioOB recs: symptom is likely not cardiac in origin     #Fetal Well Being  - s/p BMZ for fetal lung maturity (10/11-)  - NST BID  - Fetus AGA  - ATU (10/16): vtx, posterior, JOSE 16.22, BPP   - ATU (10/13): vtx, post, JOSE 16, BPP   - ATU (10/11): vtx, posterior placenta, EFW 1587 (45%ile), JOSE 17.34, BPP   - GCT (): 75  - GBS (10/12): neg    #Maternal Wellbeing  - Con't PNVs  - Reg diet   - HSQ/SCDs for DVT ppx    Helen Sheu  PGY3

## 2023-10-17 NOTE — PROGRESS NOTE ADULT - ATTENDING COMMENTS
34yo  @ 31w2d admitted for sPEC by severe range BPs. Patient is s/p IV magnesium sulfate gtt and doing well on Procardia 60 mg daily. Continue current management.

## 2023-10-18 LAB
BLD GP AB SCN SERPL QL: NEGATIVE — SIGNIFICANT CHANGE UP
BLD GP AB SCN SERPL QL: NEGATIVE — SIGNIFICANT CHANGE UP
RH IG SCN BLD-IMP: POSITIVE — SIGNIFICANT CHANGE UP
RH IG SCN BLD-IMP: POSITIVE — SIGNIFICANT CHANGE UP

## 2023-10-18 RX ADMIN — Medication 1 TABLET(S): at 21:26

## 2023-10-18 RX ADMIN — HEPARIN SODIUM 5000 UNIT(S): 5000 INJECTION INTRAVENOUS; SUBCUTANEOUS at 09:16

## 2023-10-18 RX ADMIN — HEPARIN SODIUM 5000 UNIT(S): 5000 INJECTION INTRAVENOUS; SUBCUTANEOUS at 21:26

## 2023-10-18 RX ADMIN — Medication 60 MILLIGRAM(S): at 05:40

## 2023-10-18 RX ADMIN — SERTRALINE 200 MILLIGRAM(S): 25 TABLET, FILM COATED ORAL at 21:25

## 2023-10-18 NOTE — DIETITIAN INITIAL EVALUATION ADULT - NSFNSGIIOFT_GEN_A_CORE
- Pt denies nausea, vomiting, diarrhea, or constipation at this time   - Last BM:10/17 per pt; not currently ordered for bowel regimen

## 2023-10-18 NOTE — DIETITIAN INITIAL EVALUATION ADULT - PERTINENT LABORATORY DATA
10-17    138  |  103  |  9   ----------------------------<  83  4.2   |  22  |  0.47<L>    Ca    9.4      17 Oct 2023 06:29    TPro  6.6  /  Alb  3.6  /  TBili  0.1<L>  /  DBili  x   /  AST  16  /  ALT  18  /  AlkPhos  128<H>  10-17

## 2023-10-18 NOTE — DIETITIAN INITIAL EVALUATION ADULT - PERSON TAUGHT/METHOD
Encouraged adequate consumption of meals/supplements to optimize protein-energy intake/verbal instruction/patient instructed

## 2023-10-18 NOTE — DIETITIAN INITIAL EVALUATION ADULT - PERTINENT MEDS FT
MEDICATIONS  (STANDING):  heparin   Injectable 5000 Unit(s) SubCutaneous every 12 hours  NIFEdipine XL 60 milliGRAM(s) Oral daily  prenatal multivitamin 1 Tablet(s) Oral daily  sertraline 200 milliGRAM(s) Oral daily    MEDICATIONS  (PRN):

## 2023-10-18 NOTE — PROGRESS NOTE ADULT - ATTENDING COMMENTS
Patient seen. Doing well. no headache, blurry vision, RUQ pain. +FM. No bleeding, contractions, LOF.   having BM daily    NST reactive Cat I   TOCO: none    Plan  as above  continue procardia  will schedule IOL closer to 34 weeks    Consuelo Thrasher DO

## 2023-10-18 NOTE — PROGRESS NOTE ADULT - ASSESSMENT
34yo  @ 31w3d admitted for sPEC by severe range BPs. Currently in stable condition.    #sPEC  - criteria met by severe range BPs   - s/p Lab 20 IVP (10/12 ~12a)   - C/w Procardia 60 QD  - BP's overnight 110s-130/70s-90s  - HELLP labs reviewed, wnl  - 24h urine protein = 352    #Dyspnea on Exertion  - SpO2 wnl  - EKG wnl  - TTE wnl  - Appreciate CardioOB recs: symptom is likely not cardiac in origin     #Fetal Well Being  - s/p BMZ for fetal lung maturity (10/11-)  - NST BID  - Fetus AGA  - ATU (10/16): vtx, posterior, JOSE 16.22, BPP /8  - ATU (10/13): vtx, post, JOSE 16, BPP /8  - ATU (10/11): vtx, posterior placenta, EFW 1587 (45%ile), JOSE 17.34, BPP   - GCT (): 75  - GBS (10/12): neg    #Maternal Wellbeing  - Con't PNVs  - Reg diet   - HSQ/SCDs for DVT ppx    Helen Sheu  PGY3

## 2023-10-18 NOTE — DIETITIAN INITIAL EVALUATION ADULT - ADD RECOMMEND
- menu provided  - Will continue to monitor PO intake, weight, labs, skin, GI status, diet.   - Nutrition care plan to remain consistent with pt goals of care  - RD remains available to review diet education and adjust diet recommendations as needed.

## 2023-10-18 NOTE — PROGRESS NOTE ADULT - SUBJECTIVE AND OBJECTIVE BOX
R3 Antepartum Note    Patient seen and examined at bedside, no acute overnight events. No acute complaints. Pt reports +FM, denies LOF, VB, ctx, HA, epigastric pain, blurred vision, CP, SOB, N/V, fevers, and chills.    Vital Signs Last 24 Hours  T(C): 36.8 (10-18-23 @ 05:36), Max: 36.8 (10-17-23 @ 13:48)  HR: 81 (10-18-23 @ 05:36) (74 - 101)  BP: 119/82 (10-18-23 @ 05:36) (119/82 - 138/86)  RR: 18 (10-18-23 @ 05:36) (18 - 18)  SpO2: 97% (10-18-23 @ 05:36) (97% - 98%)    CAPILLARY BLOOD GLUCOSE    Physical Exam:  General: NAD  Abdomen: Soft, non-tender, gravid  Ext: No pain or swelling    NST reactive overnight    Labs:             12.4   9.31  )-----------( 254      ( 10-17 @ 06:30 )             37.7     10-17 @ 06:29    138  |  103  |  9   ----------------------------<  83  4.2   |  22  |  0.47    Ca    9.4      10-17 @ 06:29    TPro  6.6  /  Alb  3.6  /  TBili  0.1  /  DBili  x   /  AST  16  /  ALT  18  /  AlkPhos  128  10-17 @ 06:29    PT/INR - ( 10-17 @ 06:30 )   PT: 9.9 sec;   INR: 0.94 ratio    PTT - ( 10-17 @ 06:30 )  PTT:26.3 sec    Uric Acid: (10-17 @ 06:29)  4.9      Fibrinogen: (10-17 @ 06:29)  --       LDH: (10-17 @ 06:29)  125      MEDICATIONS  (STANDING):  heparin   Injectable 5000 Unit(s) SubCutaneous every 12 hours  NIFEdipine XL 60 milliGRAM(s) Oral daily  prenatal multivitamin 1 Tablet(s) Oral daily  sertraline 200 milliGRAM(s) Oral daily

## 2023-10-19 RX ADMIN — HEPARIN SODIUM 5000 UNIT(S): 5000 INJECTION INTRAVENOUS; SUBCUTANEOUS at 21:22

## 2023-10-19 RX ADMIN — SERTRALINE 200 MILLIGRAM(S): 25 TABLET, FILM COATED ORAL at 21:22

## 2023-10-19 RX ADMIN — HEPARIN SODIUM 5000 UNIT(S): 5000 INJECTION INTRAVENOUS; SUBCUTANEOUS at 09:25

## 2023-10-19 RX ADMIN — Medication 60 MILLIGRAM(S): at 05:47

## 2023-10-19 RX ADMIN — Medication 1 TABLET(S): at 21:22

## 2023-10-19 NOTE — PROGRESS NOTE ADULT - ATTENDING COMMENTS
Pt seen at bedside. Doing well. No complaints. Cont inpatient mgmt. BPs well controlled.     Naima Green, DO

## 2023-10-19 NOTE — PROGRESS NOTE ADULT - SUBJECTIVE AND OBJECTIVE BOX
R3 Antepartum Note    Patient seen and examined at bedside, no acute overnight events. No acute complaints. Pt reports +FM, denies LOF, VB, ctx, HA, epigastric pain, blurred vision, CP, SOB, N/V, fevers, and chills.    Vital Signs Last 24 Hours  T(C): 36.6 (10-19-23 @ 06:05), Max: 36.7 (10-18-23 @ 09:16)  HR: 89 (10-19-23 @ 06:05) (87 - 92)  BP: 125/81 (10-19-23 @ 06:05) (125/81 - 141/84)  RR: 18 (10-19-23 @ 06:05) (18 - 18)  SpO2: 96% (10-19-23 @ 06:05) (95% - 97%)    CAPILLARY BLOOD GLUCOSE    Physical Exam:  General: NAD  Abdomen: Soft, non-tender, gravid  Ext: No pain or swelling    NST reactive overnight    Labs:    PT/INR - ( 10-17 @ 06:30 )   PT: 9.9 sec;   INR: 0.94 ratio    PTT - ( 10-17 @ 06:30 )  PTT:26.3 sec    Uric Acid: (10-17 @ 06:29)  4.9      Fibrinogen: (10-17 @ 06:29)  --       LDH: (10-17 @ 06:29)  125      MEDICATIONS  (STANDING):  heparin   Injectable 5000 Unit(s) SubCutaneous every 12 hours  NIFEdipine XL 60 milliGRAM(s) Oral daily  prenatal multivitamin 1 Tablet(s) Oral daily  sertraline 200 milliGRAM(s) Oral daily

## 2023-10-19 NOTE — PROGRESS NOTE ADULT - ASSESSMENT
34yo @31w4d admitted for sPEC by severe range BPs. Currently in stable condition.    #sPEC  - criteria met by severe range BPs   - s/p Lab 20 IVP (10/12 ~12a)   - C/w Procardia 60 QD  - BP's overnight 120s-130s/70s-80s  - HELLP labs reviewed, wnl  - 24h urine protein = 352    #Dyspnea on Exertion  - SpO2 wnl  - EKG wnl  - TTE wnl  - Appreciate CardioOB recs: symptom is likely not cardiac in origin     #Fetal Well Being  - s/p BMZ for fetal lung maturity (10/11-)  - NST BID  - Fetus AGA  - ATU (10/16): vtx, posterior, JOSE 16.22, BPP   - ATU (10/13): vtx, post, JOSE 16, BPP   - ATU (10/11): vtx, posterior placenta, EFW 1587 (45%ile), JOSE 17.34, BPP   - GCT (): 75  - GBS (10/12): neg    #Maternal Wellbeing  - Con't PNVs  - Reg diet   - HSQ/SCDs for DVT ppx    Helen Sheu  PGY3

## 2023-10-20 ENCOUNTER — ASOB RESULT (OUTPATIENT)
Age: 33
End: 2023-10-20

## 2023-10-20 ENCOUNTER — APPOINTMENT (OUTPATIENT)
Dept: ANTEPARTUM | Facility: CLINIC | Age: 33
End: 2023-10-20
Payer: COMMERCIAL

## 2023-10-20 LAB
ALBUMIN SERPL ELPH-MCNC: 3.6 G/DL — SIGNIFICANT CHANGE UP (ref 3.3–5)
ALBUMIN SERPL ELPH-MCNC: 3.6 G/DL — SIGNIFICANT CHANGE UP (ref 3.3–5)
ALP SERPL-CCNC: 140 U/L — HIGH (ref 40–120)
ALP SERPL-CCNC: 140 U/L — HIGH (ref 40–120)
ALT FLD-CCNC: 17 U/L — SIGNIFICANT CHANGE UP (ref 10–45)
ALT FLD-CCNC: 17 U/L — SIGNIFICANT CHANGE UP (ref 10–45)
ANION GAP SERPL CALC-SCNC: 18 MMOL/L — HIGH (ref 5–17)
ANION GAP SERPL CALC-SCNC: 18 MMOL/L — HIGH (ref 5–17)
APTT BLD: 27.4 SEC — SIGNIFICANT CHANGE UP (ref 24.5–35.6)
APTT BLD: 27.4 SEC — SIGNIFICANT CHANGE UP (ref 24.5–35.6)
AST SERPL-CCNC: 13 U/L — SIGNIFICANT CHANGE UP (ref 10–40)
AST SERPL-CCNC: 13 U/L — SIGNIFICANT CHANGE UP (ref 10–40)
BASOPHILS # BLD AUTO: 0.03 K/UL — SIGNIFICANT CHANGE UP (ref 0–0.2)
BASOPHILS # BLD AUTO: 0.03 K/UL — SIGNIFICANT CHANGE UP (ref 0–0.2)
BASOPHILS NFR BLD AUTO: 0.4 % — SIGNIFICANT CHANGE UP (ref 0–2)
BASOPHILS NFR BLD AUTO: 0.4 % — SIGNIFICANT CHANGE UP (ref 0–2)
BILIRUB SERPL-MCNC: 0.2 MG/DL — SIGNIFICANT CHANGE UP (ref 0.2–1.2)
BILIRUB SERPL-MCNC: 0.2 MG/DL — SIGNIFICANT CHANGE UP (ref 0.2–1.2)
BUN SERPL-MCNC: 8 MG/DL — SIGNIFICANT CHANGE UP (ref 7–23)
BUN SERPL-MCNC: 8 MG/DL — SIGNIFICANT CHANGE UP (ref 7–23)
CALCIUM SERPL-MCNC: 9.2 MG/DL — SIGNIFICANT CHANGE UP (ref 8.4–10.5)
CALCIUM SERPL-MCNC: 9.2 MG/DL — SIGNIFICANT CHANGE UP (ref 8.4–10.5)
CHLORIDE SERPL-SCNC: 101 MMOL/L — SIGNIFICANT CHANGE UP (ref 96–108)
CHLORIDE SERPL-SCNC: 101 MMOL/L — SIGNIFICANT CHANGE UP (ref 96–108)
CO2 SERPL-SCNC: 18 MMOL/L — LOW (ref 22–31)
CO2 SERPL-SCNC: 18 MMOL/L — LOW (ref 22–31)
CREAT SERPL-MCNC: 0.47 MG/DL — LOW (ref 0.5–1.3)
CREAT SERPL-MCNC: 0.47 MG/DL — LOW (ref 0.5–1.3)
EGFR: 130 ML/MIN/1.73M2 — SIGNIFICANT CHANGE UP
EGFR: 130 ML/MIN/1.73M2 — SIGNIFICANT CHANGE UP
EOSINOPHIL # BLD AUTO: 0.07 K/UL — SIGNIFICANT CHANGE UP (ref 0–0.5)
EOSINOPHIL # BLD AUTO: 0.07 K/UL — SIGNIFICANT CHANGE UP (ref 0–0.5)
EOSINOPHIL NFR BLD AUTO: 0.8 % — SIGNIFICANT CHANGE UP (ref 0–6)
EOSINOPHIL NFR BLD AUTO: 0.8 % — SIGNIFICANT CHANGE UP (ref 0–6)
FIBRINOGEN PPP-MCNC: 617 MG/DL — HIGH (ref 200–445)
FIBRINOGEN PPP-MCNC: 617 MG/DL — HIGH (ref 200–445)
GLUCOSE SERPL-MCNC: 162 MG/DL — HIGH (ref 70–99)
GLUCOSE SERPL-MCNC: 162 MG/DL — HIGH (ref 70–99)
HCT VFR BLD CALC: 36 % — SIGNIFICANT CHANGE UP (ref 34.5–45)
HCT VFR BLD CALC: 36 % — SIGNIFICANT CHANGE UP (ref 34.5–45)
HGB BLD-MCNC: 12.2 G/DL — SIGNIFICANT CHANGE UP (ref 11.5–15.5)
HGB BLD-MCNC: 12.2 G/DL — SIGNIFICANT CHANGE UP (ref 11.5–15.5)
IMM GRANULOCYTES NFR BLD AUTO: 0.9 % — SIGNIFICANT CHANGE UP (ref 0–0.9)
IMM GRANULOCYTES NFR BLD AUTO: 0.9 % — SIGNIFICANT CHANGE UP (ref 0–0.9)
INR BLD: 0.93 RATIO — SIGNIFICANT CHANGE UP (ref 0.85–1.18)
INR BLD: 0.93 RATIO — SIGNIFICANT CHANGE UP (ref 0.85–1.18)
LDH SERPL L TO P-CCNC: 125 U/L — SIGNIFICANT CHANGE UP (ref 50–242)
LDH SERPL L TO P-CCNC: 125 U/L — SIGNIFICANT CHANGE UP (ref 50–242)
LYMPHOCYTES # BLD AUTO: 1.77 K/UL — SIGNIFICANT CHANGE UP (ref 1–3.3)
LYMPHOCYTES # BLD AUTO: 1.77 K/UL — SIGNIFICANT CHANGE UP (ref 1–3.3)
LYMPHOCYTES # BLD AUTO: 20.8 % — SIGNIFICANT CHANGE UP (ref 13–44)
LYMPHOCYTES # BLD AUTO: 20.8 % — SIGNIFICANT CHANGE UP (ref 13–44)
MCHC RBC-ENTMCNC: 28.5 PG — SIGNIFICANT CHANGE UP (ref 27–34)
MCHC RBC-ENTMCNC: 28.5 PG — SIGNIFICANT CHANGE UP (ref 27–34)
MCHC RBC-ENTMCNC: 33.9 GM/DL — SIGNIFICANT CHANGE UP (ref 32–36)
MCHC RBC-ENTMCNC: 33.9 GM/DL — SIGNIFICANT CHANGE UP (ref 32–36)
MCV RBC AUTO: 84.1 FL — SIGNIFICANT CHANGE UP (ref 80–100)
MCV RBC AUTO: 84.1 FL — SIGNIFICANT CHANGE UP (ref 80–100)
MONOCYTES # BLD AUTO: 0.36 K/UL — SIGNIFICANT CHANGE UP (ref 0–0.9)
MONOCYTES # BLD AUTO: 0.36 K/UL — SIGNIFICANT CHANGE UP (ref 0–0.9)
MONOCYTES NFR BLD AUTO: 4.2 % — SIGNIFICANT CHANGE UP (ref 2–14)
MONOCYTES NFR BLD AUTO: 4.2 % — SIGNIFICANT CHANGE UP (ref 2–14)
NEUTROPHILS # BLD AUTO: 6.18 K/UL — SIGNIFICANT CHANGE UP (ref 1.8–7.4)
NEUTROPHILS # BLD AUTO: 6.18 K/UL — SIGNIFICANT CHANGE UP (ref 1.8–7.4)
NEUTROPHILS NFR BLD AUTO: 72.9 % — SIGNIFICANT CHANGE UP (ref 43–77)
NEUTROPHILS NFR BLD AUTO: 72.9 % — SIGNIFICANT CHANGE UP (ref 43–77)
NRBC # BLD: 0 /100 WBCS — SIGNIFICANT CHANGE UP (ref 0–0)
NRBC # BLD: 0 /100 WBCS — SIGNIFICANT CHANGE UP (ref 0–0)
PLATELET # BLD AUTO: 237 K/UL — SIGNIFICANT CHANGE UP (ref 150–400)
PLATELET # BLD AUTO: 237 K/UL — SIGNIFICANT CHANGE UP (ref 150–400)
POTASSIUM SERPL-MCNC: 3.7 MMOL/L — SIGNIFICANT CHANGE UP (ref 3.5–5.3)
POTASSIUM SERPL-MCNC: 3.7 MMOL/L — SIGNIFICANT CHANGE UP (ref 3.5–5.3)
POTASSIUM SERPL-SCNC: 3.7 MMOL/L — SIGNIFICANT CHANGE UP (ref 3.5–5.3)
POTASSIUM SERPL-SCNC: 3.7 MMOL/L — SIGNIFICANT CHANGE UP (ref 3.5–5.3)
PROT SERPL-MCNC: 7.1 G/DL — SIGNIFICANT CHANGE UP (ref 6–8.3)
PROT SERPL-MCNC: 7.1 G/DL — SIGNIFICANT CHANGE UP (ref 6–8.3)
PROTHROM AB SERPL-ACNC: 9.8 SEC — SIGNIFICANT CHANGE UP (ref 9.5–13)
PROTHROM AB SERPL-ACNC: 9.8 SEC — SIGNIFICANT CHANGE UP (ref 9.5–13)
RBC # BLD: 4.28 M/UL — SIGNIFICANT CHANGE UP (ref 3.8–5.2)
RBC # BLD: 4.28 M/UL — SIGNIFICANT CHANGE UP (ref 3.8–5.2)
RBC # FLD: 13.4 % — SIGNIFICANT CHANGE UP (ref 10.3–14.5)
RBC # FLD: 13.4 % — SIGNIFICANT CHANGE UP (ref 10.3–14.5)
SODIUM SERPL-SCNC: 137 MMOL/L — SIGNIFICANT CHANGE UP (ref 135–145)
SODIUM SERPL-SCNC: 137 MMOL/L — SIGNIFICANT CHANGE UP (ref 135–145)
URATE SERPL-MCNC: 5.5 MG/DL — SIGNIFICANT CHANGE UP (ref 2.5–7)
URATE SERPL-MCNC: 5.5 MG/DL — SIGNIFICANT CHANGE UP (ref 2.5–7)
WBC # BLD: 8.49 K/UL — SIGNIFICANT CHANGE UP (ref 3.8–10.5)
WBC # BLD: 8.49 K/UL — SIGNIFICANT CHANGE UP (ref 3.8–10.5)
WBC # FLD AUTO: 8.49 K/UL — SIGNIFICANT CHANGE UP (ref 3.8–10.5)
WBC # FLD AUTO: 8.49 K/UL — SIGNIFICANT CHANGE UP (ref 3.8–10.5)

## 2023-10-20 PROCEDURE — 76819 FETAL BIOPHYS PROFIL W/O NST: CPT | Mod: 26

## 2023-10-20 RX ADMIN — Medication 60 MILLIGRAM(S): at 05:14

## 2023-10-20 RX ADMIN — Medication 1 TABLET(S): at 21:31

## 2023-10-20 RX ADMIN — SERTRALINE 200 MILLIGRAM(S): 25 TABLET, FILM COATED ORAL at 20:39

## 2023-10-20 RX ADMIN — HEPARIN SODIUM 5000 UNIT(S): 5000 INJECTION INTRAVENOUS; SUBCUTANEOUS at 10:08

## 2023-10-20 RX ADMIN — HEPARIN SODIUM 5000 UNIT(S): 5000 INJECTION INTRAVENOUS; SUBCUTANEOUS at 20:40

## 2023-10-20 NOTE — PROGRESS NOTE ADULT - SUBJECTIVE AND OBJECTIVE BOX
R3 Antepartum Note    Patient seen and examined at bedside, no acute overnight events. No acute complaints. Pt reports +FM, denies LOF, VB, ctx, HA, epigastric pain, blurred vision, CP, SOB, N/V, fevers, and chills.    Vital Signs Last 24 Hours  T(C): 36.7 (10-20-23 @ 05:11), Max: 36.8 (10-19-23 @ 09:00)  HR: 78 (10-20-23 @ 05:11) (78 - 95)  BP: 128/76 (10-20-23 @ 05:11) (127/76 - 144/76)  RR: 18 (10-20-23 @ 05:11) (18 - 18)  SpO2: 98% (10-20-23 @ 05:11) (95% - 98%)    CAPILLARY BLOOD GLUCOSE    Physical Exam:  General: NAD  Abdomen: Soft, non-tender, gravid  Ext: No pain or swelling    NST reactive overnight    Labs:          MEDICATIONS  (STANDING):  heparin   Injectable 5000 Unit(s) SubCutaneous every 12 hours  NIFEdipine XL 60 milliGRAM(s) Oral daily  prenatal multivitamin 1 Tablet(s) Oral daily  sertraline 200 milliGRAM(s) Oral daily    MEDICATIONS  (PRN):

## 2023-10-20 NOTE — PROGRESS NOTE ADULT - ATTENDING COMMENTS
34yo @31w5d admitted for sPEC by severe range BPs. Currently in stable condition.  -BPs well controlled on procardia 60XL   -NST reviewed overnight and this AM, reactive  -Continue inpatient monitoring until 34 weeks, delivery is indicated at that time for IOL     Maryjo Johnson MD .

## 2023-10-20 NOTE — PROGRESS NOTE ADULT - ASSESSMENT
34yo @31w5d admitted for sPEC by severe range BPs. Currently in stable condition.    #sPEC  - criteria met by severe range BPs   - s/p Lab 20 IVP (10/12 ~12a)   - C/w Procardia 60 QD  - BP's overnight 120s-130s/70s-80s  - HELLP labs reviewed, wnl  - 24h urine protein = 352    #Dyspnea on Exertion  - SpO2 wnl  - EKG wnl  - TTE wnl  - Appreciate CardioOB recs: symptom is likely not cardiac in origin     #Fetal Well Being  - s/p BMZ for fetal lung maturity (10/11-)  - NST BID  - Fetus AGA  - ATU (10/16): vtx, posterior, JOSE 16.22, BPP   - ATU (10/13): vtx, post, JOSE 16, BPP   - ATU (10/11): vtx, posterior placenta, EFW 1587 (45%ile), JOSE 17.34, BPP   - GCT (): 75  - GBS (10/12): neg    #Maternal Wellbeing  - Con't PNVs  - Reg diet   - HSQ/SCDs for DVT ppx    Helen Sheu  PGY3

## 2023-10-21 RX ORDER — ACETAMINOPHEN 500 MG
975 TABLET ORAL ONCE
Refills: 0 | Status: COMPLETED | OUTPATIENT
Start: 2023-10-21 | End: 2023-10-21

## 2023-10-21 RX ORDER — METOCLOPRAMIDE HCL 10 MG
5 TABLET ORAL ONCE
Refills: 0 | Status: COMPLETED | OUTPATIENT
Start: 2023-10-21 | End: 2023-10-21

## 2023-10-21 RX ORDER — DIPHENHYDRAMINE HCL 50 MG
25 CAPSULE ORAL EVERY 4 HOURS
Refills: 0 | Status: DISCONTINUED | OUTPATIENT
Start: 2023-10-21 | End: 2023-11-01

## 2023-10-21 RX ADMIN — Medication 975 MILLIGRAM(S): at 23:43

## 2023-10-21 RX ADMIN — Medication 975 MILLIGRAM(S): at 18:04

## 2023-10-21 RX ADMIN — Medication 5 MILLIGRAM(S): at 23:43

## 2023-10-21 RX ADMIN — Medication 975 MILLIGRAM(S): at 18:47

## 2023-10-21 RX ADMIN — Medication 1 TABLET(S): at 21:45

## 2023-10-21 RX ADMIN — HEPARIN SODIUM 5000 UNIT(S): 5000 INJECTION INTRAVENOUS; SUBCUTANEOUS at 21:46

## 2023-10-21 RX ADMIN — Medication 25 MILLIGRAM(S): at 23:43

## 2023-10-21 RX ADMIN — SERTRALINE 200 MILLIGRAM(S): 25 TABLET, FILM COATED ORAL at 21:45

## 2023-10-21 RX ADMIN — HEPARIN SODIUM 5000 UNIT(S): 5000 INJECTION INTRAVENOUS; SUBCUTANEOUS at 09:00

## 2023-10-21 RX ADMIN — Medication 60 MILLIGRAM(S): at 06:18

## 2023-10-21 NOTE — PROGRESS NOTE ADULT - ASSESSMENT
34yo @31w6d admitted for sPEC by severe range BPs. Currently in stable condition.    #sPEC  - criteria met by severe range BPs   - s/p Lab 20 IVP (10/12 ~12a)   - C/w Procardia 60 QD  - BP's overnight 120s-130s/70s-80s  - HELLP labs reviewed, wnl  - 24h urine protein = 352    #Dyspnea on Exertion  - SpO2 wnl  - EKG wnl  - TTE wnl  - Appreciate CardioOB recs: symptom is likely not cardiac in origin     #Fetal Well Being  - s/p BMZ for fetal lung maturity (10/11-)  - NST BID  - Fetus AGA  - ATU (10/16): vtx, posterior, JOSE 16.22, BPP   - ATU (10/13): vtx, post, JOSE 16, BPP   - ATU (10/11): vtx, posterior placenta, EFW 1587 (45%ile), JOSE 17.34, BPP   - GCT (): 75  - GBS (10/12): neg    #Maternal Wellbeing  - Con't PNVs  - Reg diet   - HSQ/SCDs for DVT ppx    CORINNA Yin, PGY3

## 2023-10-21 NOTE — PROGRESS NOTE ADULT - SUBJECTIVE AND OBJECTIVE BOX
Antepartum Note, HD#11    Interval events: n/a    Patient seen and examined at bedside, no acute overnight events. No acute complaints. Pt reports +FM, denies LOF, VB, ctx, HA, epigastric pain, blurred vision, CP, SOB, N/V, fevers, and chills.    Vital Signs Last 24 Hours  T(C): 36.8 (10-21-23 @ 00:54), Max: 36.8 (10-21-23 @ 00:54)  HR: 93 (10-21-23 @ 00:54) (78 - 97)  BP: 119/82 (10-21-23 @ 00:54) (119/82 - 136/90)  RR: 18 (10-21-23 @ 00:54) (18 - 18)  SpO2: 97% (10-21-23 @ 00:54) (96% - 98%)    CAPILLARY BLOOD GLUCOSE          Physical Exam:  General: NAD  Abdomen: Soft, non-tender, gravid  Ext: No pain or swelling    NST reactive overnight    Labs:             12.2   8.49  )-----------( 237      ( 10-20 @ 11:20 )             36.0     10-20 @ 11:20    137  |  101  |  8   ----------------------------<  162  3.7   |  18  |  0.47    Ca    9.2      10-20 @ 11:20    TPro  7.1  /  Alb  3.6  /  TBili  0.2  /  DBili  x   /  AST  13  /  ALT  17  /  AlkPhos  140  10-20 @ 11:20    PT/INR - ( 10-20 @ 11:20 )   PT: 9.8 sec;   INR: 0.93 ratio    PTT - ( 10-20 @ 11:20 )  PTT:27.4 sec    Uric Acid: (10-20 @ 11:20)  5.5      Fibrinogen: (10-20 @ 11:20)  --       LDH: (10-20 @ 11:20)  125        MEDICATIONS  (STANDING):  heparin   Injectable 5000 Unit(s) SubCutaneous every 12 hours  NIFEdipine XL 60 milliGRAM(s) Oral daily  prenatal multivitamin 1 Tablet(s) Oral daily  sertraline 200 milliGRAM(s) Oral daily    MEDICATIONS  (PRN):

## 2023-10-21 NOTE — PROGRESS NOTE ADULT - ATTENDING COMMENTS
Patient seen. Agree with above note. Patient has no PEC symptoms. No bleeding, LOF, CTXs.  Continue procardia 60mg  Cat I tracing  TOCO none  labor and PEC precautions given    Consuelo Thrasher DO

## 2023-10-22 RX ORDER — METOCLOPRAMIDE HCL 10 MG
10 TABLET ORAL ONCE
Refills: 0 | Status: COMPLETED | OUTPATIENT
Start: 2023-10-22 | End: 2023-10-22

## 2023-10-22 RX ORDER — ACETAMINOPHEN 500 MG
975 TABLET ORAL ONCE
Refills: 0 | Status: COMPLETED | OUTPATIENT
Start: 2023-10-22 | End: 2023-10-22

## 2023-10-22 RX ADMIN — Medication 975 MILLIGRAM(S): at 22:30

## 2023-10-22 RX ADMIN — Medication 1 TABLET(S): at 15:18

## 2023-10-22 RX ADMIN — Medication 60 MILLIGRAM(S): at 05:15

## 2023-10-22 RX ADMIN — Medication 10 MILLIGRAM(S): at 21:46

## 2023-10-22 RX ADMIN — Medication 975 MILLIGRAM(S): at 00:34

## 2023-10-22 RX ADMIN — HEPARIN SODIUM 5000 UNIT(S): 5000 INJECTION INTRAVENOUS; SUBCUTANEOUS at 21:48

## 2023-10-22 RX ADMIN — Medication 1 TABLET(S): at 21:47

## 2023-10-22 RX ADMIN — Medication 975 MILLIGRAM(S): at 21:46

## 2023-10-22 RX ADMIN — Medication 25 MILLIGRAM(S): at 21:46

## 2023-10-22 RX ADMIN — HEPARIN SODIUM 5000 UNIT(S): 5000 INJECTION INTRAVENOUS; SUBCUTANEOUS at 10:06

## 2023-10-22 RX ADMIN — Medication 1 TABLET(S): at 15:58

## 2023-10-22 RX ADMIN — SERTRALINE 200 MILLIGRAM(S): 25 TABLET, FILM COATED ORAL at 21:47

## 2023-10-22 NOTE — PROGRESS NOTE ADULT - SUBJECTIVE AND OBJECTIVE BOX
R3 Antepartum Note, HD#12    Patient seen and examined at bedside, no acute overnight events. No acute complaints. Pt reports +FM, denies LOF, VB, ctx, HA, epigastric pain, blurred vision, CP, SOB, N/V, fevers, and chills.    Vital Signs Last 24 Hours  T(C): 36.7 (10-22-23 @ 05:18), Max: 36.8 (10-21-23 @ 13:20)  HR: 80 (10-22-23 @ 05:18) (75 - 87)  BP: 137/78 (10-22-23 @ 05:18) (120/74 - 137/78)  RR: 18 (10-22-23 @ 05:18) (17 - 20)  SpO2: 97% (10-22-23 @ 05:18) (97% - 98%)    CAPILLARY BLOOD GLUCOSE          Physical Exam:  General: NAD  Abdomen: Soft, non-tender, gravid  Ext: No pain or swelling    Labs:             12.2   8.49  )-----------( 237      ( 10-20 @ 11:20 )             36.0     10-20 @ 11:20    137  |  101  |  8   ----------------------------<  162  3.7   |  18  |  0.47    Ca    9.2      10-20 @ 11:20    TPro  7.1  /  Alb  3.6  /  TBili  0.2  /  DBili  x   /  AST  13  /  ALT  17  /  AlkPhos  140  10-20 @ 11:20    PT/INR - ( 10-20 @ 11:20 )   PT: 9.8 sec;   INR: 0.93 ratio    PTT - ( 10-20 @ 11:20 )  PTT:27.4 sec    Uric Acid: (10-20 @ 11:20)  5.5      Fibrinogen: (10-20 @ 11:20)  --       LDH: (10-20 @ 11:20)  125        MEDICATIONS  (STANDING):  heparin   Injectable 5000 Unit(s) SubCutaneous every 12 hours  NIFEdipine XL 60 milliGRAM(s) Oral daily  prenatal multivitamin 1 Tablet(s) Oral daily  sertraline 200 milliGRAM(s) Oral daily    MEDICATIONS  (PRN):  diphenhydrAMINE 25 milliGRAM(s) Oral every 4 hours PRN Rash and/or Itching   R3 Antepartum Note, HD#12    Patient seen and examined at bedside, no acute overnight events. No acute complaints. Pt reports +FM, denies LOF, VB, ctx, epigastric pain, blurred vision, CP, SOB, N/V, fevers, and chills. Was reporting a headache overnight but improved with reglan and benadryl     Vital Signs Last 24 Hours  T(C): 36.7 (10-22-23 @ 05:18), Max: 36.8 (10-21-23 @ 13:20)  HR: 80 (10-22-23 @ 05:18) (75 - 87)  BP: 137/78 (10-22-23 @ 05:18) (120/74 - 137/78)  RR: 18 (10-22-23 @ 05:18) (17 - 20)  SpO2: 97% (10-22-23 @ 05:18) (97% - 98%)    CAPILLARY BLOOD GLUCOSE          Physical Exam:  General: NAD  Abdomen: Soft, non-tender, gravid  Ext: No pain or swelling    Labs:             12.2   8.49  )-----------( 237      ( 10-20 @ 11:20 )             36.0     10-20 @ 11:20    137  |  101  |  8   ----------------------------<  162  3.7   |  18  |  0.47    Ca    9.2      10-20 @ 11:20    TPro  7.1  /  Alb  3.6  /  TBili  0.2  /  DBili  x   /  AST  13  /  ALT  17  /  AlkPhos  140  10-20 @ 11:20    PT/INR - ( 10-20 @ 11:20 )   PT: 9.8 sec;   INR: 0.93 ratio    PTT - ( 10-20 @ 11:20 )  PTT:27.4 sec    Uric Acid: (10-20 @ 11:20)  5.5      Fibrinogen: (10-20 @ 11:20)  --       LDH: (10-20 @ 11:20)  125        MEDICATIONS  (STANDING):  heparin   Injectable 5000 Unit(s) SubCutaneous every 12 hours  NIFEdipine XL 60 milliGRAM(s) Oral daily  prenatal multivitamin 1 Tablet(s) Oral daily  sertraline 200 milliGRAM(s) Oral daily    MEDICATIONS  (PRN):  diphenhydrAMINE 25 milliGRAM(s) Oral every 4 hours PRN Rash and/or Itching

## 2023-10-22 NOTE — PROGRESS NOTE ADULT - ASSESSMENT
34yo @32w admitted for sPEC by severe range BPs. Maternal and fetal status reassuring     #sPEC  - criteria met by severe range BPs   - s/p Lab 20 IVP (10/12 ~12a)   - C/w Procardia 60 QD  - BP's overnight 120s/70s  - HELLP labs reviewed, wnl  - 24h urine protein = 352    #Dyspnea on Exertion  - SpO2 wnl  - EKG wnl  - TTE wnl  - Appreciate CardioOB recs: symptom is likely not cardiac in origin     #Fetal Well Being  - s/p BMZ for fetal lung maturity (10/11-)  - NST BID  - Fetus AGA  - ATU (10/20): vtx, post, JOSE 23, BPP 8/8  - ATU (10/16): vtx, posterior, JOSE 16.22, BPP 8/8  - ATU (10/13): vtx, post, JOSE 16, BPP 8/8  - ATU (10/11): vtx, posterior placenta, EFW 1587 (45%ile), JOSE 17.34, BPP 8/8  - GCT (): 75  - GBS (10/12): neg    #Maternal Wellbeing  - Con't PNVs  - Reg diet   - HSQ/SCDs for DVT ppx    DONNA Neely PGY3

## 2023-10-22 NOTE — PROGRESS NOTE ADULT - ATTENDING COMMENTS
Patient seen around 840am. states she has a headache now. its a typically headache she usually gets. sometimes she can get headaches for 1-2 days. feels no different. no blurry vision, RUQ pain. +FM. no CTXs    Cat I reactive  TOCO: intermittent contractions    plan:  As above  will try caffeine and if no change in headache will consider Fioricet    Consuelo Thrasher DO

## 2023-10-23 ENCOUNTER — ASOB RESULT (OUTPATIENT)
Age: 33
End: 2023-10-23

## 2023-10-23 ENCOUNTER — APPOINTMENT (OUTPATIENT)
Dept: ANTEPARTUM | Facility: CLINIC | Age: 33
End: 2023-10-23
Payer: COMMERCIAL

## 2023-10-23 PROCEDURE — 76818 FETAL BIOPHYS PROFILE W/NST: CPT | Mod: 26

## 2023-10-23 RX ADMIN — HEPARIN SODIUM 5000 UNIT(S): 5000 INJECTION INTRAVENOUS; SUBCUTANEOUS at 09:26

## 2023-10-23 RX ADMIN — SERTRALINE 200 MILLIGRAM(S): 25 TABLET, FILM COATED ORAL at 20:54

## 2023-10-23 RX ADMIN — Medication 60 MILLIGRAM(S): at 05:14

## 2023-10-23 RX ADMIN — Medication 1 TABLET(S): at 22:00

## 2023-10-23 RX ADMIN — HEPARIN SODIUM 5000 UNIT(S): 5000 INJECTION INTRAVENOUS; SUBCUTANEOUS at 20:55

## 2023-10-23 NOTE — PROGRESS NOTE ADULT - ASSESSMENT
34yo @32w1d admitted for sPEC by severe range BPs. Maternal and fetal status reassuring.    #sPEC  - criteria met by severe range BPs   - s/p Lab 20 IVP (10/12 ~12a)   - C/w Procardia 60 QD  - BP's overnight 110s-140s/70s  - HELLP labs reviewed, wnl  - 24h urine protein = 352    #Dyspnea on Exertion  - SpO2 wnl  - EKG wnl  - TTE wnl  - Appreciate CardioOB recs: symptom is likely not cardiac in origin     #Fetal Well Being  - s/p BMZ for fetal lung maturity (10/11-)  - NST BID  - Fetus AGA  - ATU (10/20): vtx, post, JOSE 23, BPP 8/8  - ATU (10/16): vtx, posterior, JOSE 16.22, BPP 8/8  - ATU (10/13): vtx, post, JOSE 16, BPP 8/8  - ATU (10/11): vtx, posterior placenta, EFW 1587 (45%ile), JOSE 17.34, BPP 8/8  - GCT (): 75  - GBS (10/12): neg    #Maternal Wellbeing  - Con't PNVs  - Reg diet   - HSQ/SCDs for DVT ppx    Helen Sheu  PGY3

## 2023-10-23 NOTE — PROGRESS NOTE ADULT - ATTENDING COMMENTS
Pt seen at bedside. Doing well. HA from this weekend mostly resolved. States happens frequently and this feels typical for her. Continue to monitor closely.     Naima Paniccia, DO

## 2023-10-23 NOTE — PROGRESS NOTE ADULT - SUBJECTIVE AND OBJECTIVE BOX
R3 Antepartum Note    Patient seen and examined at bedside, no acute overnight events. No acute complaints. Pt reports +FM, denies LOF, VB, ctx, epigastric pain, blurred vision, CP, SOB, N/V, fevers, and chills. Had headache overnight improved by Tylenol/Reglan/Benadryl to a 4/10 from 7/10.    Vital Signs Last 24 Hours  T(C): 36.6 (10-23-23 @ 05:13), Max: 37.2 (10-22-23 @ 13:03)  HR: 91 (10-23-23 @ 05:13) (82 - 99)  BP: 120/77 (10-23-23 @ 05:13) (116/70 - 145/75)  RR: 18 (10-23-23 @ 05:13) (18 - 18)  SpO2: 96% (10-23-23 @ 05:13) (96% - 99%)    CAPILLARY BLOOD GLUCOSE    Physical Exam:  General: NAD  Abdomen: Soft, non-tender, gravid  Ext: No pain or swelling    NST reactive overnight    Labs:    PT/INR - ( 10-20 @ 11:20 )   PT: 9.8 sec;   INR: 0.93 ratio    PTT - ( 10-20 @ 11:20 )  PTT:27.4 sec    Uric Acid: (10-20 @ 11:20)  5.5      Fibrinogen: (10-20 @ 11:20)  --       LDH: (10-20 @ 11:20)  125      MEDICATIONS  (STANDING):  heparin   Injectable 5000 Unit(s) SubCutaneous every 12 hours  NIFEdipine XL 60 milliGRAM(s) Oral daily  prenatal multivitamin 1 Tablet(s) Oral daily  sertraline 200 milliGRAM(s) Oral daily    MEDICATIONS  (PRN):  acetaminophen 325 mG/butalbital 50 mG/caffeine 40 mG 1 Tablet(s) Oral every 6 hours PRN headache  diphenhydrAMINE 25 milliGRAM(s) Oral every 4 hours PRN Rash and/or Itching

## 2023-10-24 LAB
ALBUMIN SERPL ELPH-MCNC: 3.4 G/DL — SIGNIFICANT CHANGE UP (ref 3.3–5)
ALBUMIN SERPL ELPH-MCNC: 3.4 G/DL — SIGNIFICANT CHANGE UP (ref 3.3–5)
ALP SERPL-CCNC: 132 U/L — HIGH (ref 40–120)
ALP SERPL-CCNC: 132 U/L — HIGH (ref 40–120)
ALT FLD-CCNC: 27 U/L — SIGNIFICANT CHANGE UP (ref 10–45)
ALT FLD-CCNC: 27 U/L — SIGNIFICANT CHANGE UP (ref 10–45)
ANION GAP SERPL CALC-SCNC: 14 MMOL/L — SIGNIFICANT CHANGE UP (ref 5–17)
ANION GAP SERPL CALC-SCNC: 14 MMOL/L — SIGNIFICANT CHANGE UP (ref 5–17)
APTT BLD: 26.8 SEC — SIGNIFICANT CHANGE UP (ref 24.5–35.6)
APTT BLD: 26.8 SEC — SIGNIFICANT CHANGE UP (ref 24.5–35.6)
AST SERPL-CCNC: 24 U/L — SIGNIFICANT CHANGE UP (ref 10–40)
AST SERPL-CCNC: 24 U/L — SIGNIFICANT CHANGE UP (ref 10–40)
BASOPHILS # BLD AUTO: 0.04 K/UL — SIGNIFICANT CHANGE UP (ref 0–0.2)
BASOPHILS # BLD AUTO: 0.04 K/UL — SIGNIFICANT CHANGE UP (ref 0–0.2)
BASOPHILS NFR BLD AUTO: 0.5 % — SIGNIFICANT CHANGE UP (ref 0–2)
BASOPHILS NFR BLD AUTO: 0.5 % — SIGNIFICANT CHANGE UP (ref 0–2)
BILIRUB SERPL-MCNC: 0.2 MG/DL — SIGNIFICANT CHANGE UP (ref 0.2–1.2)
BILIRUB SERPL-MCNC: 0.2 MG/DL — SIGNIFICANT CHANGE UP (ref 0.2–1.2)
BLD GP AB SCN SERPL QL: NEGATIVE — SIGNIFICANT CHANGE UP
BLD GP AB SCN SERPL QL: NEGATIVE — SIGNIFICANT CHANGE UP
BUN SERPL-MCNC: 8 MG/DL — SIGNIFICANT CHANGE UP (ref 7–23)
BUN SERPL-MCNC: 8 MG/DL — SIGNIFICANT CHANGE UP (ref 7–23)
CALCIUM SERPL-MCNC: 9.3 MG/DL — SIGNIFICANT CHANGE UP (ref 8.4–10.5)
CALCIUM SERPL-MCNC: 9.3 MG/DL — SIGNIFICANT CHANGE UP (ref 8.4–10.5)
CHLORIDE SERPL-SCNC: 101 MMOL/L — SIGNIFICANT CHANGE UP (ref 96–108)
CHLORIDE SERPL-SCNC: 101 MMOL/L — SIGNIFICANT CHANGE UP (ref 96–108)
CO2 SERPL-SCNC: 21 MMOL/L — LOW (ref 22–31)
CO2 SERPL-SCNC: 21 MMOL/L — LOW (ref 22–31)
CREAT SERPL-MCNC: 0.48 MG/DL — LOW (ref 0.5–1.3)
CREAT SERPL-MCNC: 0.48 MG/DL — LOW (ref 0.5–1.3)
EGFR: 130 ML/MIN/1.73M2 — SIGNIFICANT CHANGE UP
EGFR: 130 ML/MIN/1.73M2 — SIGNIFICANT CHANGE UP
EOSINOPHIL # BLD AUTO: 0.11 K/UL — SIGNIFICANT CHANGE UP (ref 0–0.5)
EOSINOPHIL # BLD AUTO: 0.11 K/UL — SIGNIFICANT CHANGE UP (ref 0–0.5)
EOSINOPHIL NFR BLD AUTO: 1.3 % — SIGNIFICANT CHANGE UP (ref 0–6)
EOSINOPHIL NFR BLD AUTO: 1.3 % — SIGNIFICANT CHANGE UP (ref 0–6)
FIBRINOGEN PPP-MCNC: 578 MG/DL — HIGH (ref 200–445)
FIBRINOGEN PPP-MCNC: 578 MG/DL — HIGH (ref 200–445)
GLUCOSE SERPL-MCNC: 79 MG/DL — SIGNIFICANT CHANGE UP (ref 70–99)
GLUCOSE SERPL-MCNC: 79 MG/DL — SIGNIFICANT CHANGE UP (ref 70–99)
HCT VFR BLD CALC: 35 % — SIGNIFICANT CHANGE UP (ref 34.5–45)
HCT VFR BLD CALC: 35 % — SIGNIFICANT CHANGE UP (ref 34.5–45)
HGB BLD-MCNC: 11.6 G/DL — SIGNIFICANT CHANGE UP (ref 11.5–15.5)
HGB BLD-MCNC: 11.6 G/DL — SIGNIFICANT CHANGE UP (ref 11.5–15.5)
IMM GRANULOCYTES NFR BLD AUTO: 0.8 % — SIGNIFICANT CHANGE UP (ref 0–0.9)
IMM GRANULOCYTES NFR BLD AUTO: 0.8 % — SIGNIFICANT CHANGE UP (ref 0–0.9)
INR BLD: 0.93 RATIO — SIGNIFICANT CHANGE UP (ref 0.85–1.18)
INR BLD: 0.93 RATIO — SIGNIFICANT CHANGE UP (ref 0.85–1.18)
LDH SERPL L TO P-CCNC: 116 U/L — SIGNIFICANT CHANGE UP (ref 50–242)
LDH SERPL L TO P-CCNC: 116 U/L — SIGNIFICANT CHANGE UP (ref 50–242)
LYMPHOCYTES # BLD AUTO: 2.32 K/UL — SIGNIFICANT CHANGE UP (ref 1–3.3)
LYMPHOCYTES # BLD AUTO: 2.32 K/UL — SIGNIFICANT CHANGE UP (ref 1–3.3)
LYMPHOCYTES # BLD AUTO: 27.1 % — SIGNIFICANT CHANGE UP (ref 13–44)
LYMPHOCYTES # BLD AUTO: 27.1 % — SIGNIFICANT CHANGE UP (ref 13–44)
MCHC RBC-ENTMCNC: 27.9 PG — SIGNIFICANT CHANGE UP (ref 27–34)
MCHC RBC-ENTMCNC: 27.9 PG — SIGNIFICANT CHANGE UP (ref 27–34)
MCHC RBC-ENTMCNC: 33.1 GM/DL — SIGNIFICANT CHANGE UP (ref 32–36)
MCHC RBC-ENTMCNC: 33.1 GM/DL — SIGNIFICANT CHANGE UP (ref 32–36)
MCV RBC AUTO: 84.1 FL — SIGNIFICANT CHANGE UP (ref 80–100)
MCV RBC AUTO: 84.1 FL — SIGNIFICANT CHANGE UP (ref 80–100)
MONOCYTES # BLD AUTO: 0.58 K/UL — SIGNIFICANT CHANGE UP (ref 0–0.9)
MONOCYTES # BLD AUTO: 0.58 K/UL — SIGNIFICANT CHANGE UP (ref 0–0.9)
MONOCYTES NFR BLD AUTO: 6.8 % — SIGNIFICANT CHANGE UP (ref 2–14)
MONOCYTES NFR BLD AUTO: 6.8 % — SIGNIFICANT CHANGE UP (ref 2–14)
NEUTROPHILS # BLD AUTO: 5.45 K/UL — SIGNIFICANT CHANGE UP (ref 1.8–7.4)
NEUTROPHILS # BLD AUTO: 5.45 K/UL — SIGNIFICANT CHANGE UP (ref 1.8–7.4)
NEUTROPHILS NFR BLD AUTO: 63.5 % — SIGNIFICANT CHANGE UP (ref 43–77)
NEUTROPHILS NFR BLD AUTO: 63.5 % — SIGNIFICANT CHANGE UP (ref 43–77)
NRBC # BLD: 0 /100 WBCS — SIGNIFICANT CHANGE UP (ref 0–0)
NRBC # BLD: 0 /100 WBCS — SIGNIFICANT CHANGE UP (ref 0–0)
PLATELET # BLD AUTO: 238 K/UL — SIGNIFICANT CHANGE UP (ref 150–400)
PLATELET # BLD AUTO: 238 K/UL — SIGNIFICANT CHANGE UP (ref 150–400)
POTASSIUM SERPL-MCNC: 4.1 MMOL/L — SIGNIFICANT CHANGE UP (ref 3.5–5.3)
POTASSIUM SERPL-MCNC: 4.1 MMOL/L — SIGNIFICANT CHANGE UP (ref 3.5–5.3)
POTASSIUM SERPL-SCNC: 4.1 MMOL/L — SIGNIFICANT CHANGE UP (ref 3.5–5.3)
POTASSIUM SERPL-SCNC: 4.1 MMOL/L — SIGNIFICANT CHANGE UP (ref 3.5–5.3)
PROT SERPL-MCNC: 6.6 G/DL — SIGNIFICANT CHANGE UP (ref 6–8.3)
PROT SERPL-MCNC: 6.6 G/DL — SIGNIFICANT CHANGE UP (ref 6–8.3)
PROTHROM AB SERPL-ACNC: 10.2 SEC — SIGNIFICANT CHANGE UP (ref 9.5–13)
PROTHROM AB SERPL-ACNC: 10.2 SEC — SIGNIFICANT CHANGE UP (ref 9.5–13)
RBC # BLD: 4.16 M/UL — SIGNIFICANT CHANGE UP (ref 3.8–5.2)
RBC # BLD: 4.16 M/UL — SIGNIFICANT CHANGE UP (ref 3.8–5.2)
RBC # FLD: 13.4 % — SIGNIFICANT CHANGE UP (ref 10.3–14.5)
RBC # FLD: 13.4 % — SIGNIFICANT CHANGE UP (ref 10.3–14.5)
RH IG SCN BLD-IMP: POSITIVE — SIGNIFICANT CHANGE UP
RH IG SCN BLD-IMP: POSITIVE — SIGNIFICANT CHANGE UP
SODIUM SERPL-SCNC: 136 MMOL/L — SIGNIFICANT CHANGE UP (ref 135–145)
SODIUM SERPL-SCNC: 136 MMOL/L — SIGNIFICANT CHANGE UP (ref 135–145)
URATE SERPL-MCNC: 5.7 MG/DL — SIGNIFICANT CHANGE UP (ref 2.5–7)
URATE SERPL-MCNC: 5.7 MG/DL — SIGNIFICANT CHANGE UP (ref 2.5–7)
WBC # BLD: 8.57 K/UL — SIGNIFICANT CHANGE UP (ref 3.8–10.5)
WBC # BLD: 8.57 K/UL — SIGNIFICANT CHANGE UP (ref 3.8–10.5)
WBC # FLD AUTO: 8.57 K/UL — SIGNIFICANT CHANGE UP (ref 3.8–10.5)
WBC # FLD AUTO: 8.57 K/UL — SIGNIFICANT CHANGE UP (ref 3.8–10.5)

## 2023-10-24 RX ADMIN — Medication 60 MILLIGRAM(S): at 06:12

## 2023-10-24 RX ADMIN — SERTRALINE 200 MILLIGRAM(S): 25 TABLET, FILM COATED ORAL at 20:02

## 2023-10-24 RX ADMIN — HEPARIN SODIUM 5000 UNIT(S): 5000 INJECTION INTRAVENOUS; SUBCUTANEOUS at 20:02

## 2023-10-24 RX ADMIN — HEPARIN SODIUM 5000 UNIT(S): 5000 INJECTION INTRAVENOUS; SUBCUTANEOUS at 09:16

## 2023-10-24 RX ADMIN — Medication 1 TABLET(S): at 20:03

## 2023-10-24 NOTE — PROGRESS NOTE ADULT - ASSESSMENT
32yo @32w2d admitted for sPEC by severe range BPs. Maternal and fetal status reassuring.    #sPEC  - criteria met by severe range BPs   - s/p Lab 20 IVP (10/12 ~)   - C/w Procardia 60 QD  - BP's overnight 120s-130s/70s-80s  - HELLP labs reviewed, wnl  - 24h urine protein = 352    #Dyspnea on Exertion  - SpO2 wnl  - EKG wnl  - TTE wnl  - Appreciate CardioOB recs: symptom is likely not cardiac in origin     #Fetal Well Being  - s/p BMZ for fetal lung maturity (10/11-)  - NST BID  - Fetus AGA  - ATU (10/20): vtx, post, JOSE 23, BPP 8/8  - ATU (10/16): vtx, posterior, JOSE 16.22, BPP 8/8  - ATU (10/13): vtx, post, JOSE 16, BPP 8/8  - ATU (10/11): vtx, posterior placenta, EFW 1587 (45%ile), JOSE 17.34, BPP 8/8  - GCT (): 75  - GBS (10/12): neg    #Maternal Wellbeing  - Con't PNVs  - Reg diet   - HSQ/SCDs for DVT ppx    Helen Sheu  PGY3

## 2023-10-24 NOTE — PROGRESS NOTE ADULT - SUBJECTIVE AND OBJECTIVE BOX
R3 Antepartum Note    Patient seen and examined at bedside, no acute overnight events. No acute complaints. Pt reports +FM, denies LOF, VB, ctx, HA, epigastric pain, blurred vision, CP, SOB, N/V, fevers, and chills.    Vital Signs Last 24 Hours  T(C): 36.5 (10-24-23 @ 06:15), Max: 36.9 (10-23-23 @ 09:00)  HR: 84 (10-24-23 @ 06:15) (76 - 104)  BP: 135/83 (10-24-23 @ 06:15) (124/71 - 136/92)  RR: 18 (10-24-23 @ 06:15) (18 - 18)  SpO2: 96% (10-24-23 @ 06:15) (96% - 99%)    CAPILLARY BLOOD GLUCOSE    Physical Exam:  General: NAD  Abdomen: Soft, non-tender, gravid  Ext: No pain or swelling    NST reactive overnight    Labs:    MEDICATIONS  (STANDING):  heparin   Injectable 5000 Unit(s) SubCutaneous every 12 hours  NIFEdipine XL 60 milliGRAM(s) Oral daily  prenatal multivitamin 1 Tablet(s) Oral daily  sertraline 200 milliGRAM(s) Oral daily    MEDICATIONS  (PRN):  acetaminophen 325 mG/butalbital 50 mG/caffeine 40 mG 1 Tablet(s) Oral every 6 hours PRN headache  diphenhydrAMINE 25 milliGRAM(s) Oral every 4 hours PRN Rash and/or Itching

## 2023-10-24 NOTE — PROGRESS NOTE ADULT - ATTENDING COMMENTS
Patient seen. Feels well. No PEC symptoms. headache resolved. No obstetric complaints.     EFM: Cat I   TOCO nont      Plan:   Case sent to scheduling to schedule IOl for 34 weeks  Continue as above    Consuelo Thrasher DO

## 2023-10-25 ENCOUNTER — TRANSCRIPTION ENCOUNTER (OUTPATIENT)
Age: 33
End: 2023-10-25

## 2023-10-25 RX ORDER — CALCIUM CARBONATE 500(1250)
2 TABLET ORAL THREE TIMES A DAY
Refills: 0 | Status: DISCONTINUED | OUTPATIENT
Start: 2023-10-25 | End: 2023-11-05

## 2023-10-25 RX ADMIN — SERTRALINE 200 MILLIGRAM(S): 25 TABLET, FILM COATED ORAL at 20:13

## 2023-10-25 RX ADMIN — Medication 2 TABLET(S): at 20:59

## 2023-10-25 RX ADMIN — Medication 60 MILLIGRAM(S): at 05:49

## 2023-10-25 RX ADMIN — Medication 1 TABLET(S): at 20:13

## 2023-10-25 RX ADMIN — HEPARIN SODIUM 5000 UNIT(S): 5000 INJECTION INTRAVENOUS; SUBCUTANEOUS at 09:13

## 2023-10-25 RX ADMIN — HEPARIN SODIUM 5000 UNIT(S): 5000 INJECTION INTRAVENOUS; SUBCUTANEOUS at 20:13

## 2023-10-25 NOTE — PROGRESS NOTE ADULT - ATTENDING COMMENTS
Pt seen on am rounds and note reviewed    SHe feels well, + FM    BPs have been normal 130/80    A/P:34yo @32w3d admitted for sPEC by severe range BPs. Maternal and fetal status reassuring.    Procardia 60 q day    #Fetal Well Being  - s/p BMZ for fetal lung maturity (10/11-)  - NST BID  - Fetus AGA  - ATU (10/11): vtx, posterior placenta, EFW 1587 (45%ile), JOSE 17.34, BPP     Jono Beckett mD

## 2023-10-25 NOTE — PROGRESS NOTE ADULT - SUBJECTIVE AND OBJECTIVE BOX
R3 Antepartum Note    Patient seen and examined at bedside, no acute overnight events. No acute complaints. Pt reports +FM, denies LOF, VB, ctx, HA, epigastric pain, blurred vision, CP, SOB, N/V, fevers, and chills.    Vital Signs Last 24 Hours  T(C): 36.7 (10-25-23 @ 05:43), Max: 36.9 (10-24-23 @ 09:13)  HR: 83 (10-25-23 @ 05:43) (81 - 99)  BP: 131/77 (10-25-23 @ 05:43) (120/78 - 138/88)  RR: 18 (10-25-23 @ 05:43) (15 - 18)  SpO2: 97% (10-25-23 @ 05:43) (96% - 98%)    CAPILLARY BLOOD GLUCOSE    Physical Exam:  General: NAD  Abdomen: Soft, non-tender, gravid  Ext: No pain or swelling    NST reactive overnight    Labs:             11.6   8.57  )-----------( 238      ( 10-24 @ 06:44 )             35.0     10-24 @ 06:44    136  |  101  |  8   ----------------------------<  79  4.1   |  21  |  0.48    Ca    9.3      10-24 @ 06:44    TPro  6.6  /  Alb  3.4  /  TBili  0.2  /  DBili  x   /  AST  24  /  ALT  27  /  AlkPhos  132  10-24 @ 06:44    PT/INR - ( 10-24 @ 06:44 )   PT: 10.2 sec;   INR: 0.93 ratio    PTT - ( 10-24 @ 06:44 )  PTT:26.8 sec    Uric Acid: (10-24 @ 06:44)  5.7      Fibrinogen: (10-24 @ 06:44)  --       LDH: (10-24 @ 06:44)  116        MEDICATIONS  (STANDING):  heparin   Injectable 5000 Unit(s) SubCutaneous every 12 hours  NIFEdipine XL 60 milliGRAM(s) Oral daily  prenatal multivitamin 1 Tablet(s) Oral daily  sertraline 200 milliGRAM(s) Oral daily    MEDICATIONS  (PRN):  acetaminophen 325 mG/butalbital 50 mG/caffeine 40 mG 1 Tablet(s) Oral every 6 hours PRN headache  diphenhydrAMINE 25 milliGRAM(s) Oral every 4 hours PRN Rash and/or Itching

## 2023-10-25 NOTE — PROGRESS NOTE ADULT - ASSESSMENT
34yo @32w3d admitted for sPEC by severe range BPs. Maternal and fetal status reassuring.    #sPEC  - criteria met by severe range BPs   - s/p Lab 20 IVP (10/12 ~12a)   - C/w Procardia 60 QD  - BP's overnight 130s/70s-80s  - HELLP labs reviewed, wnl  - 24h urine protein = 352    #Dyspnea on Exertion  - SpO2 wnl  - EKG wnl  - TTE wnl  - Appreciate CardioOB recs: symptom is likely not cardiac in origin     #Fetal Well Being  - s/p BMZ for fetal lung maturity (10/11-)  - NST BID  - Fetus AGA  - ATU (10/20): vtx, post, JOSE 23, BPP 8/8  - ATU (10/16): vtx, posterior, JOSE 16.22, BPP 8/8  - ATU (10/13): vtx, post, JOSE 16, BPP 8/8  - ATU (10/11): vtx, posterior placenta, EFW 1587 (45%ile), JOSE 17.34, BPP 8/8  - GCT (): 75  - GBS (10/12): neg    #Maternal Wellbeing  - Con't PNVs  - Reg diet   - HSQ/SCDs for DVT ppx    Helen Sheu  PGY3

## 2023-10-26 ENCOUNTER — APPOINTMENT (OUTPATIENT)
Dept: ANTEPARTUM | Facility: CLINIC | Age: 33
End: 2023-10-26
Payer: COMMERCIAL

## 2023-10-26 ENCOUNTER — ASOB RESULT (OUTPATIENT)
Age: 33
End: 2023-10-26

## 2023-10-26 PROCEDURE — 76818 FETAL BIOPHYS PROFILE W/NST: CPT | Mod: 26

## 2023-10-26 RX ADMIN — SERTRALINE 200 MILLIGRAM(S): 25 TABLET, FILM COATED ORAL at 21:07

## 2023-10-26 RX ADMIN — Medication 1 TABLET(S): at 19:14

## 2023-10-26 RX ADMIN — Medication 1 TABLET(S): at 21:08

## 2023-10-26 RX ADMIN — Medication 60 MILLIGRAM(S): at 06:16

## 2023-10-26 RX ADMIN — HEPARIN SODIUM 5000 UNIT(S): 5000 INJECTION INTRAVENOUS; SUBCUTANEOUS at 08:36

## 2023-10-26 RX ADMIN — Medication 1 TABLET(S): at 20:00

## 2023-10-26 RX ADMIN — HEPARIN SODIUM 5000 UNIT(S): 5000 INJECTION INTRAVENOUS; SUBCUTANEOUS at 21:08

## 2023-10-26 NOTE — PROGRESS NOTE ADULT - ASSESSMENT
34yo @32w4d admitted for sPEC by severe range BPs. Maternal and fetal status reassuring.    #sPEC  - criteria met by severe range BPs   - s/p Lab 20 IVP (10/12 ~12a)   - C/w Procardia 60 QD  - BP's overnight 120s-130s/70s  - HELLP labs reviewed, wnl  - 24h urine protein = 352    #Dyspnea on Exertion  - SpO2 wnl  - EKG wnl  - TTE wnl  - Appreciate CardioOB recs: symptom is likely not cardiac in origin     #Fetal Well Being  - s/p BMZ for fetal lung maturity (10/11-)  - NST BID  - Fetus AGA  - ATU (10/20): vtx, post, JOSE 23, BPP 8/8  - ATU (10/16): vtx, posterior, JOSE 16.22, BPP 8/8  - ATU (10/13): vtx, post, JOSE 16, BPP 8/8  - ATU (10/11): vtx, posterior placenta, EFW 1587 (45%ile), JOSE 17.34, BPP 8/8  - GCT (): 75  - GBS (10/12): neg    #Maternal Wellbeing  - Con't PNVs  - Reg diet   - HSQ/SCDs for DVT ppx    Helen Sheu  PGY3

## 2023-10-26 NOTE — PROGRESS NOTE ADULT - SUBJECTIVE AND OBJECTIVE BOX
R3 Antepartum Note    Patient seen and examined at bedside, no acute overnight events. No acute complaints. Pt reports +FM, denies LOF, VB, ctx, HA, epigastric pain, blurred vision, CP, SOB, N/V, fevers, and chills.    Vital Signs Last 24 Hours  T(C): 36.8 (10-26-23 @ 06:13), Max: 36.9 (10-25-23 @ 21:30)  HR: 74 (10-26-23 @ 06:13) (74 - 90)  BP: 130/74 (10-26-23 @ 06:13) (124/76 - 137/82)  RR: 18 (10-26-23 @ 06:13) (18 - 18)  SpO2: 97% (10-26-23 @ 06:13) (96% - 98%)    CAPILLARY BLOOD GLUCOSE    Physical Exam:  General: NAD  Abdomen: Soft, non-tender, gravid  Ext: No pain or swelling    NST reactive overnight    Labs:    PT/INR - ( 10-24 @ 06:44 )   PT: 10.2 sec;   INR: 0.93 ratio    PTT - ( 10-24 @ 06:44 )  PTT:26.8 sec    Uric Acid: (10-24 @ 06:44)  5.7      Fibrinogen: (10-24 @ 06:44)  --       LDH: (10-24 @ 06:44)  116      MEDICATIONS  (STANDING):  heparin   Injectable 5000 Unit(s) SubCutaneous every 12 hours  NIFEdipine XL 60 milliGRAM(s) Oral daily  prenatal multivitamin 1 Tablet(s) Oral daily  sertraline 200 milliGRAM(s) Oral daily    MEDICATIONS  (PRN):  acetaminophen 325 mG/butalbital 50 mG/caffeine 40 mG 1 Tablet(s) Oral every 6 hours PRN headache  calcium carbonate    500 mG (Tums) Chewable 2 Tablet(s) Chew three times a day PRN Heartburn  diphenhydrAMINE 25 milliGRAM(s) Oral every 4 hours PRN Rash and/or Itching

## 2023-10-27 RX ADMIN — HEPARIN SODIUM 5000 UNIT(S): 5000 INJECTION INTRAVENOUS; SUBCUTANEOUS at 08:53

## 2023-10-27 RX ADMIN — SERTRALINE 200 MILLIGRAM(S): 25 TABLET, FILM COATED ORAL at 21:34

## 2023-10-27 RX ADMIN — Medication 60 MILLIGRAM(S): at 05:38

## 2023-10-27 RX ADMIN — HEPARIN SODIUM 5000 UNIT(S): 5000 INJECTION INTRAVENOUS; SUBCUTANEOUS at 21:34

## 2023-10-27 RX ADMIN — Medication 1 TABLET(S): at 21:34

## 2023-10-27 NOTE — PROGRESS NOTE ADULT - ASSESSMENT
32yo @32w5d admitted for sPEC by severe range BPs. Maternal and fetal status reassuring.    #sPEC  - criteria met by severe range BPs   - s/p Lab 20 IVP (10/12 ~a)   - C/w Procardia 60 QD  - BP's overnight 120s-130s/60s-80s  - HELLP labs reviewed, wnl  - 24h urine protein = 352    #Dyspnea on Exertion  - SpO2 wnl  - EKG wnl  - TTE wnl  - Appreciate CardioOB recs: symptom is likely not cardiac in origin     #Fetal Well Being  - s/p BMZ for fetal lung maturity (10/11-)  - NST BID  - Fetus AGA  - ATU (10/20): vtx, post, JOSE 23, BPP 8/8  - ATU (10/16): vtx, posterior, JOSE 16.22, BPP 8/8  - ATU (10/13): vtx, post, JOSE 16, BPP 8/8  - ATU (10/11): vtx, posterior placenta, EFW 1587 (45%ile), JOSE 17.34, BPP 8/8  - GCT (): 75  - GBS (10/12): neg    #Maternal Wellbeing  - Con't PNVs  - Reg diet   - HSQ/SCDs for DVT ppx    Helen Sheu  PGY3

## 2023-10-27 NOTE — PROGRESS NOTE ADULT - SUBJECTIVE AND OBJECTIVE BOX
R3 Antepartum Note    Patient seen and examined at bedside, no acute overnight events. No acute complaints. Pt reports +FM, denies LOF, VB, ctx, HA, epigastric pain, blurred vision, CP, SOB, N/V, fevers, and chills.    Vital Signs Last 24 Hours  T(C): 36.6 (10-27-23 @ 04:54), Max: 36.8 (10-26-23 @ 17:00)  HR: 84 (10-27-23 @ 04:54) (75 - 101)  BP: 134/81 (10-27-23 @ 04:54) (125/68 - 136/77)  RR: 18 (10-27-23 @ 04:54) (18 - 18)  SpO2: 96% (10-27-23 @ 04:54) (96% - 97%)    CAPILLARY BLOOD GLUCOSE    Physical Exam:  General: NAD  Abdomen: Soft, non-tender, gravid  Ext: No pain or swelling    NST reactive overnight    Labs:    MEDICATIONS  (STANDING):  heparin   Injectable 5000 Unit(s) SubCutaneous every 12 hours  NIFEdipine XL 60 milliGRAM(s) Oral daily  prenatal multivitamin 1 Tablet(s) Oral daily  sertraline 200 milliGRAM(s) Oral daily    MEDICATIONS  (PRN):  acetaminophen 325 mG/butalbital 50 mG/caffeine 40 mG 1 Tablet(s) Oral every 6 hours PRN headache  calcium carbonate    500 mG (Tums) Chewable 2 Tablet(s) Chew three times a day PRN Heartburn  diphenhydrAMINE 25 milliGRAM(s) Oral every 4 hours PRN Rash and/or Itching

## 2023-10-27 NOTE — PROGRESS NOTE ADULT - ATTENDING COMMENTS
PT DOING WELL. NO C/O  VSS ON PROCARDIA.  NST REVIEWED & REACTIVE.  PLAN IS FOR DELIVERY AT 34W    [facility]

## 2023-10-28 RX ADMIN — SERTRALINE 200 MILLIGRAM(S): 25 TABLET, FILM COATED ORAL at 20:41

## 2023-10-28 RX ADMIN — Medication 1 TABLET(S): at 20:41

## 2023-10-28 RX ADMIN — HEPARIN SODIUM 5000 UNIT(S): 5000 INJECTION INTRAVENOUS; SUBCUTANEOUS at 08:23

## 2023-10-28 RX ADMIN — HEPARIN SODIUM 5000 UNIT(S): 5000 INJECTION INTRAVENOUS; SUBCUTANEOUS at 20:40

## 2023-10-28 RX ADMIN — Medication 60 MILLIGRAM(S): at 06:22

## 2023-10-28 NOTE — PROGRESS NOTE ADULT - SUBJECTIVE AND OBJECTIVE BOX
PGY 3 Antepartum Note  Patient seen and examined at bedside in NAD. Denies any CTX, LOF or VB.  Reports good fetal movement. Denies HA, blurry vision, RUQ pain.    ICU Vital Signs Last 24 Hrs  T(C): 36.7 (27 Oct 2023 21:30), Max: 36.8 (27 Oct 2023 17:45)  T(F): 98.1 (27 Oct 2023 21:30), Max: 98.2 (27 Oct 2023 17:45)  HR: 80 (27 Oct 2023 21:30) (80 - 84)  BP: 133/79 (27 Oct 2023 21:30) (118/79 - 133/79)  BP(mean): --  ABP: --  ABP(mean): --  RR: 18 (27 Oct 2023 21:30) (18 - 18)  SpO2: 98% (27 Oct 2023 21:30) (98% - 98%)    O2 Parameters below as of 27 Oct 2023 21:30  Patient On (Oxygen Delivery Method): room air      NST reactive overnight    Gen: NAD  Abd: gravid, nontender  Ext: no edema,  no calf tenderness      MEDICATIONS  (STANDING):  heparin   Injectable 5000 Unit(s) SubCutaneous every 12 hours  NIFEdipine XL 60 milliGRAM(s) Oral daily  prenatal multivitamin 1 Tablet(s) Oral daily  sertraline 200 milliGRAM(s) Oral daily    MEDICATIONS  (PRN):  acetaminophen 325 mG/butalbital 50 mG/caffeine 40 mG 1 Tablet(s) Oral every 6 hours PRN headache  calcium carbonate    500 mG (Tums) Chewable 2 Tablet(s) Chew three times a day PRN Heartburn  diphenhydrAMINE 25 milliGRAM(s) Oral every 4 hours PRN Rash and/or Itching     PGY 3 Antepartum Note  Patient seen and examined at bedside in NAD. Denies any CTX, LOF or VB.  Reports good fetal movement. Denies HA, blurry vision, RUQ pain.    ICU Vital Signs Last 24 Hrs  T(C): 36.7 (27 Oct 2023 21:30), Max: 36.8 (27 Oct 2023 17:45)  T(F): 98.1 (27 Oct 2023 21:30), Max: 98.2 (27 Oct 2023 17:45)  HR: 80 (27 Oct 2023 21:30) (80 - 84)  BP: 133/79 (27 Oct 2023 21:30) (118/79 - 133/79)  BP(mean): --  ABP: --  ABP(mean): --  RR: 18 (27 Oct 2023 21:30) (18 - 18)  SpO2: 98% (27 Oct 2023 21:30) (98% - 98%)    O2 Parameters below as of 27 Oct 2023 21:30  Patient On (Oxygen Delivery Method): room air      NST reactive overnight    Gen: NAD  Abd: gravid, nontender  Ext: no edema,  no calf tenderness      MEDICATIONS  (STANDING):  heparin   Injectable 5000 Unit(s) SubCutaneous every 12 hours  NIFEdipine XL 60 milliGRAM(s) Oral daily  prenatal multivitamin 1 Tablet(s) Oral daily  sertraline 200 milliGRAM(s) Oral daily    MEDICATIONS  (PRN):  acetaminophen 325 mG/butalbital 50 mG/caffeine 40 mG 1 Tablet(s) Oral every 6 hours PRN headache  calcium carbonate    500 mG (Tums) Chewable 2 Tablet(s) Chew three times a day PRN Heartburn  diphenhydrAMINE 25 milliGRAM(s) Oral every 4 hours PRN Rash and/or Itching      Add

## 2023-10-28 NOTE — PROGRESS NOTE ADULT - ASSESSMENT
34yo @32w6d admitted for sPEC by severe range BPs. Maternal and fetal status reassuring.    #sPEC  - criteria met by severe range BPs   - s/p Lab 20 IVP (10/12 ~12a)   - C/w Procardia 60 QD  - BP's overnight 130/70s  - HELLP labs reviewed, wnl  - 24h urine protein = 352    #Dyspnea on Exertion  - SpO2 wnl  - EKG wnl  - TTE wnl  - Appreciate CardioOB recs: symptom is likely not cardiac in origin     #Fetal Well Being  - s/p BMZ for fetal lung maturity (10/11-)  - NST BID  - Fetus AGA  - ATU (10/20): vtx, post, JOSE 23, BPP 8/8  - ATU (10/16): vtx, posterior, JOSE 16.22, BPP 8/8  - ATU (10/13): vtx, post, JOSE 16, BPP 8/8  - ATU (10/11): vtx, posterior placenta, EFW 1587 (45%ile), JOSE 17.34, BPP /8  - GCT (): 75  - GBS (10/12): neg    #Maternal Wellbeing  - Con't PNVs  - Reg diet   - HSQ/SCDs for DVT ppx    CORINNA Yin, PGY3

## 2023-10-29 RX ADMIN — HEPARIN SODIUM 5000 UNIT(S): 5000 INJECTION INTRAVENOUS; SUBCUTANEOUS at 08:34

## 2023-10-29 RX ADMIN — SERTRALINE 200 MILLIGRAM(S): 25 TABLET, FILM COATED ORAL at 20:14

## 2023-10-29 RX ADMIN — Medication 1 TABLET(S): at 20:15

## 2023-10-29 RX ADMIN — HEPARIN SODIUM 5000 UNIT(S): 5000 INJECTION INTRAVENOUS; SUBCUTANEOUS at 20:16

## 2023-10-29 RX ADMIN — Medication 60 MILLIGRAM(S): at 06:10

## 2023-10-29 NOTE — PROGRESS NOTE ADULT - NS ATTEND AMEND GEN_ALL_CORE FT
pt seen and examined - continue current management  fetal status reassuring  cont bps meds  appreciate mfm consultation  Belen High MD
extensive counseling done  dx of preelampsia with severe fetatures   will stop mag as recommended restart as needed  reviewed risks of preeclampsia and  delivery/induction, possible section as needed  will complete beta  appreciate NICU and MFM consultation  transfer to     Belen High MD
OB attending  pt feeling well  bps controlled  fetal status reassuring  cont current management   IOL at 34 weeks or with changes    Belen High MD

## 2023-10-29 NOTE — PROGRESS NOTE ADULT - ASSESSMENT
A/P: 34yo @33w admitted for sPEC by severe range BPs. Maternal and fetal status reassuring.    #sPEC  - criteria met by severe range BPs s/p Magnesium at time of admission 10/11-10/12  - s/p Lab 20 IVP (10/12 ~12a)   - C/w Procardia 60 QD  - BP's overnight 110s-130/60s-80s  - q3 day HELLP labs next due 10/29  - 24h urine protein = 352    #Dyspnea on Exertion  - SpO2 wnl  - EKG wnl  - TTE wnl  - Appreciate CardioOB recs: symptom is likely not cardiac in origin     #Fetal Well Being  - s/p BMZ for fetal lung maturity (10/11-)  - NST BID  - Fetus AGA  - ATU (10/20): vtx, post, JOSE 23, BPP 8/8  - ATU (10/16): vtx, posterior, JOSE 16.22, BPP 8/8  - ATU (10/13): vtx, post, JOSE 16, BPP 8/8  - ATU (10/11): vtx, posterior placenta, EFW 1587 (45%ile), JOSE 17.34, BPP /8  - GCT (): 75  - GBS (10/12): neg    #Maternal Wellbeing  - Con't PNVs  - Reg diet   - HSQ/SCDs for DVT ppx    CORINNA Yin, PGY3

## 2023-10-29 NOTE — PROGRESS NOTE ADULT - SUBJECTIVE AND OBJECTIVE BOX
PGY 3 Antepartum Note  Patient seen and examined at bedside in NAD. Denies any CTX, LOF or VB.  Reports good fetal movement. Denies HA, blurry vision, RUQ pain.      Vital Signs Last 24 Hrs  T(C): 36.7 (29 Oct 2023 00:54), Max: 36.7 (28 Oct 2023 08:26)  T(F): 98 (29 Oct 2023 00:54), Max: 98.1 (28 Oct 2023 08:26)  HR: 79 (29 Oct 2023 00:54) (72 - 97)  BP: 128/69 (29 Oct 2023 00:54) (119/78 - 139/83)  RR: 18 (29 Oct 2023 00:54) (18 - 18)  SpO2: 96% (29 Oct 2023 00:54) (96% - 98%)    Parameters below as of 29 Oct 2023 00:54  Patient On (Oxygen Delivery Method): room air      NST reactive overnight    Gen: NAD  Abd: gravid, nontender  Ext: no edema,  no calf tenderness      MEDICATIONS  (STANDING):  heparin   Injectable 5000 Unit(s) SubCutaneous every 12 hours  NIFEdipine XL 60 milliGRAM(s) Oral daily  prenatal multivitamin 1 Tablet(s) Oral daily  sertraline 200 milliGRAM(s) Oral daily    MEDICATIONS  (PRN):  acetaminophen 325 mG/butalbital 50 mG/caffeine 40 mG 1 Tablet(s) Oral every 6 hours PRN headache  calcium carbonate    500 mG (Tums) Chewable 2 Tablet(s) Chew three times a day PRN Heartburn  diphenhydrAMINE 25 milliGRAM(s) Oral every 4 hours PRN Rash and/or Itching

## 2023-10-30 ENCOUNTER — APPOINTMENT (OUTPATIENT)
Dept: ANTEPARTUM | Facility: CLINIC | Age: 33
End: 2023-10-30
Payer: COMMERCIAL

## 2023-10-30 ENCOUNTER — ASOB RESULT (OUTPATIENT)
Age: 33
End: 2023-10-30

## 2023-10-30 LAB
ALBUMIN SERPL ELPH-MCNC: 3.4 G/DL — SIGNIFICANT CHANGE UP (ref 3.3–5)
ALBUMIN SERPL ELPH-MCNC: 3.4 G/DL — SIGNIFICANT CHANGE UP (ref 3.3–5)
ALP SERPL-CCNC: 148 U/L — HIGH (ref 40–120)
ALP SERPL-CCNC: 148 U/L — HIGH (ref 40–120)
ALT FLD-CCNC: 20 U/L — SIGNIFICANT CHANGE UP (ref 10–45)
ALT FLD-CCNC: 20 U/L — SIGNIFICANT CHANGE UP (ref 10–45)
ANION GAP SERPL CALC-SCNC: 13 MMOL/L — SIGNIFICANT CHANGE UP (ref 5–17)
ANION GAP SERPL CALC-SCNC: 13 MMOL/L — SIGNIFICANT CHANGE UP (ref 5–17)
APPEARANCE UR: CLEAR — SIGNIFICANT CHANGE UP
APPEARANCE UR: CLEAR — SIGNIFICANT CHANGE UP
APTT BLD: 28.6 SEC — SIGNIFICANT CHANGE UP (ref 24.5–35.6)
APTT BLD: 28.6 SEC — SIGNIFICANT CHANGE UP (ref 24.5–35.6)
AST SERPL-CCNC: 17 U/L — SIGNIFICANT CHANGE UP (ref 10–40)
AST SERPL-CCNC: 17 U/L — SIGNIFICANT CHANGE UP (ref 10–40)
BASOPHILS # BLD AUTO: 0.04 K/UL — SIGNIFICANT CHANGE UP (ref 0–0.2)
BASOPHILS # BLD AUTO: 0.04 K/UL — SIGNIFICANT CHANGE UP (ref 0–0.2)
BASOPHILS NFR BLD AUTO: 0.5 % — SIGNIFICANT CHANGE UP (ref 0–2)
BASOPHILS NFR BLD AUTO: 0.5 % — SIGNIFICANT CHANGE UP (ref 0–2)
BILIRUB SERPL-MCNC: 0.2 MG/DL — SIGNIFICANT CHANGE UP (ref 0.2–1.2)
BILIRUB SERPL-MCNC: 0.2 MG/DL — SIGNIFICANT CHANGE UP (ref 0.2–1.2)
BILIRUB UR-MCNC: NEGATIVE — SIGNIFICANT CHANGE UP
BILIRUB UR-MCNC: NEGATIVE — SIGNIFICANT CHANGE UP
BUN SERPL-MCNC: 6 MG/DL — LOW (ref 7–23)
BUN SERPL-MCNC: 6 MG/DL — LOW (ref 7–23)
CALCIUM SERPL-MCNC: 9.4 MG/DL — SIGNIFICANT CHANGE UP (ref 8.4–10.5)
CALCIUM SERPL-MCNC: 9.4 MG/DL — SIGNIFICANT CHANGE UP (ref 8.4–10.5)
CHLORIDE SERPL-SCNC: 102 MMOL/L — SIGNIFICANT CHANGE UP (ref 96–108)
CHLORIDE SERPL-SCNC: 102 MMOL/L — SIGNIFICANT CHANGE UP (ref 96–108)
CO2 SERPL-SCNC: 22 MMOL/L — SIGNIFICANT CHANGE UP (ref 22–31)
CO2 SERPL-SCNC: 22 MMOL/L — SIGNIFICANT CHANGE UP (ref 22–31)
COLOR SPEC: SIGNIFICANT CHANGE UP
COLOR SPEC: SIGNIFICANT CHANGE UP
CREAT ?TM UR-MCNC: 58 MG/DL — SIGNIFICANT CHANGE UP
CREAT ?TM UR-MCNC: 58 MG/DL — SIGNIFICANT CHANGE UP
CREAT SERPL-MCNC: 0.46 MG/DL — LOW (ref 0.5–1.3)
CREAT SERPL-MCNC: 0.46 MG/DL — LOW (ref 0.5–1.3)
DIFF PNL FLD: NEGATIVE — SIGNIFICANT CHANGE UP
DIFF PNL FLD: NEGATIVE — SIGNIFICANT CHANGE UP
EGFR: 131 ML/MIN/1.73M2 — SIGNIFICANT CHANGE UP
EGFR: 131 ML/MIN/1.73M2 — SIGNIFICANT CHANGE UP
EOSINOPHIL # BLD AUTO: 0.13 K/UL — SIGNIFICANT CHANGE UP (ref 0–0.5)
EOSINOPHIL # BLD AUTO: 0.13 K/UL — SIGNIFICANT CHANGE UP (ref 0–0.5)
EOSINOPHIL NFR BLD AUTO: 1.5 % — SIGNIFICANT CHANGE UP (ref 0–6)
EOSINOPHIL NFR BLD AUTO: 1.5 % — SIGNIFICANT CHANGE UP (ref 0–6)
FIBRINOGEN PPP-MCNC: 594 MG/DL — HIGH (ref 200–445)
FIBRINOGEN PPP-MCNC: 594 MG/DL — HIGH (ref 200–445)
GLUCOSE SERPL-MCNC: 79 MG/DL — SIGNIFICANT CHANGE UP (ref 70–99)
GLUCOSE SERPL-MCNC: 79 MG/DL — SIGNIFICANT CHANGE UP (ref 70–99)
GLUCOSE UR QL: NEGATIVE — SIGNIFICANT CHANGE UP
GLUCOSE UR QL: NEGATIVE — SIGNIFICANT CHANGE UP
HCT VFR BLD CALC: 34.2 % — LOW (ref 34.5–45)
HCT VFR BLD CALC: 34.2 % — LOW (ref 34.5–45)
HGB BLD-MCNC: 11.6 G/DL — SIGNIFICANT CHANGE UP (ref 11.5–15.5)
HGB BLD-MCNC: 11.6 G/DL — SIGNIFICANT CHANGE UP (ref 11.5–15.5)
IMM GRANULOCYTES NFR BLD AUTO: 1 % — HIGH (ref 0–0.9)
IMM GRANULOCYTES NFR BLD AUTO: 1 % — HIGH (ref 0–0.9)
INR BLD: 0.87 RATIO — SIGNIFICANT CHANGE UP (ref 0.85–1.18)
INR BLD: 0.87 RATIO — SIGNIFICANT CHANGE UP (ref 0.85–1.18)
KETONES UR-MCNC: NEGATIVE — SIGNIFICANT CHANGE UP
KETONES UR-MCNC: NEGATIVE — SIGNIFICANT CHANGE UP
LDH SERPL L TO P-CCNC: 125 U/L — SIGNIFICANT CHANGE UP (ref 50–242)
LDH SERPL L TO P-CCNC: 125 U/L — SIGNIFICANT CHANGE UP (ref 50–242)
LEUKOCYTE ESTERASE UR-ACNC: NEGATIVE — SIGNIFICANT CHANGE UP
LEUKOCYTE ESTERASE UR-ACNC: NEGATIVE — SIGNIFICANT CHANGE UP
LYMPHOCYTES # BLD AUTO: 2.07 K/UL — SIGNIFICANT CHANGE UP (ref 1–3.3)
LYMPHOCYTES # BLD AUTO: 2.07 K/UL — SIGNIFICANT CHANGE UP (ref 1–3.3)
LYMPHOCYTES # BLD AUTO: 24.6 % — SIGNIFICANT CHANGE UP (ref 13–44)
LYMPHOCYTES # BLD AUTO: 24.6 % — SIGNIFICANT CHANGE UP (ref 13–44)
MCHC RBC-ENTMCNC: 28.6 PG — SIGNIFICANT CHANGE UP (ref 27–34)
MCHC RBC-ENTMCNC: 28.6 PG — SIGNIFICANT CHANGE UP (ref 27–34)
MCHC RBC-ENTMCNC: 33.9 GM/DL — SIGNIFICANT CHANGE UP (ref 32–36)
MCHC RBC-ENTMCNC: 33.9 GM/DL — SIGNIFICANT CHANGE UP (ref 32–36)
MCV RBC AUTO: 84.4 FL — SIGNIFICANT CHANGE UP (ref 80–100)
MCV RBC AUTO: 84.4 FL — SIGNIFICANT CHANGE UP (ref 80–100)
MONOCYTES # BLD AUTO: 0.38 K/UL — SIGNIFICANT CHANGE UP (ref 0–0.9)
MONOCYTES # BLD AUTO: 0.38 K/UL — SIGNIFICANT CHANGE UP (ref 0–0.9)
MONOCYTES NFR BLD AUTO: 4.5 % — SIGNIFICANT CHANGE UP (ref 2–14)
MONOCYTES NFR BLD AUTO: 4.5 % — SIGNIFICANT CHANGE UP (ref 2–14)
NEUTROPHILS # BLD AUTO: 5.71 K/UL — SIGNIFICANT CHANGE UP (ref 1.8–7.4)
NEUTROPHILS # BLD AUTO: 5.71 K/UL — SIGNIFICANT CHANGE UP (ref 1.8–7.4)
NEUTROPHILS NFR BLD AUTO: 67.9 % — SIGNIFICANT CHANGE UP (ref 43–77)
NEUTROPHILS NFR BLD AUTO: 67.9 % — SIGNIFICANT CHANGE UP (ref 43–77)
NITRITE UR-MCNC: NEGATIVE — SIGNIFICANT CHANGE UP
NITRITE UR-MCNC: NEGATIVE — SIGNIFICANT CHANGE UP
NRBC # BLD: 0 /100 WBCS — SIGNIFICANT CHANGE UP (ref 0–0)
NRBC # BLD: 0 /100 WBCS — SIGNIFICANT CHANGE UP (ref 0–0)
PH UR: 6.5 — SIGNIFICANT CHANGE UP (ref 5–8)
PH UR: 6.5 — SIGNIFICANT CHANGE UP (ref 5–8)
PLATELET # BLD AUTO: 214 K/UL — SIGNIFICANT CHANGE UP (ref 150–400)
PLATELET # BLD AUTO: 214 K/UL — SIGNIFICANT CHANGE UP (ref 150–400)
POTASSIUM SERPL-MCNC: 4 MMOL/L — SIGNIFICANT CHANGE UP (ref 3.5–5.3)
POTASSIUM SERPL-MCNC: 4 MMOL/L — SIGNIFICANT CHANGE UP (ref 3.5–5.3)
POTASSIUM SERPL-SCNC: 4 MMOL/L — SIGNIFICANT CHANGE UP (ref 3.5–5.3)
POTASSIUM SERPL-SCNC: 4 MMOL/L — SIGNIFICANT CHANGE UP (ref 3.5–5.3)
PROT ?TM UR-MCNC: 11 MG/DL — SIGNIFICANT CHANGE UP (ref 0–12)
PROT ?TM UR-MCNC: 11 MG/DL — SIGNIFICANT CHANGE UP (ref 0–12)
PROT ?TM UR-MCNC: 8 MG/DL — SIGNIFICANT CHANGE UP (ref 0–12)
PROT ?TM UR-MCNC: 8 MG/DL — SIGNIFICANT CHANGE UP (ref 0–12)
PROT SERPL-MCNC: 6.7 G/DL — SIGNIFICANT CHANGE UP (ref 6–8.3)
PROT SERPL-MCNC: 6.7 G/DL — SIGNIFICANT CHANGE UP (ref 6–8.3)
PROT UR-MCNC: NEGATIVE — SIGNIFICANT CHANGE UP
PROT UR-MCNC: NEGATIVE — SIGNIFICANT CHANGE UP
PROT/CREAT UR-RTO: 0.2 RATIO — SIGNIFICANT CHANGE UP (ref 0–0.2)
PROT/CREAT UR-RTO: 0.2 RATIO — SIGNIFICANT CHANGE UP (ref 0–0.2)
PROTHROM AB SERPL-ACNC: 9.6 SEC — SIGNIFICANT CHANGE UP (ref 9.5–13)
PROTHROM AB SERPL-ACNC: 9.6 SEC — SIGNIFICANT CHANGE UP (ref 9.5–13)
RBC # BLD: 4.05 M/UL — SIGNIFICANT CHANGE UP (ref 3.8–5.2)
RBC # BLD: 4.05 M/UL — SIGNIFICANT CHANGE UP (ref 3.8–5.2)
RBC # FLD: 13.5 % — SIGNIFICANT CHANGE UP (ref 10.3–14.5)
RBC # FLD: 13.5 % — SIGNIFICANT CHANGE UP (ref 10.3–14.5)
SODIUM SERPL-SCNC: 137 MMOL/L — SIGNIFICANT CHANGE UP (ref 135–145)
SODIUM SERPL-SCNC: 137 MMOL/L — SIGNIFICANT CHANGE UP (ref 135–145)
SP GR SPEC: 1.01 — SIGNIFICANT CHANGE UP (ref 1.01–1.02)
SP GR SPEC: 1.01 — SIGNIFICANT CHANGE UP (ref 1.01–1.02)
URATE SERPL-MCNC: 5.7 MG/DL — SIGNIFICANT CHANGE UP (ref 2.5–7)
URATE SERPL-MCNC: 5.7 MG/DL — SIGNIFICANT CHANGE UP (ref 2.5–7)
UROBILINOGEN FLD QL: NEGATIVE — SIGNIFICANT CHANGE UP
UROBILINOGEN FLD QL: NEGATIVE — SIGNIFICANT CHANGE UP
WBC # BLD: 8.41 K/UL — SIGNIFICANT CHANGE UP (ref 3.8–10.5)
WBC # BLD: 8.41 K/UL — SIGNIFICANT CHANGE UP (ref 3.8–10.5)
WBC # FLD AUTO: 8.41 K/UL — SIGNIFICANT CHANGE UP (ref 3.8–10.5)
WBC # FLD AUTO: 8.41 K/UL — SIGNIFICANT CHANGE UP (ref 3.8–10.5)

## 2023-10-30 PROCEDURE — 76818 FETAL BIOPHYS PROFILE W/NST: CPT | Mod: 26

## 2023-10-30 RX ADMIN — HEPARIN SODIUM 5000 UNIT(S): 5000 INJECTION INTRAVENOUS; SUBCUTANEOUS at 20:03

## 2023-10-30 RX ADMIN — SERTRALINE 200 MILLIGRAM(S): 25 TABLET, FILM COATED ORAL at 20:05

## 2023-10-30 RX ADMIN — HEPARIN SODIUM 5000 UNIT(S): 5000 INJECTION INTRAVENOUS; SUBCUTANEOUS at 08:44

## 2023-10-30 RX ADMIN — Medication 60 MILLIGRAM(S): at 05:53

## 2023-10-30 RX ADMIN — Medication 1 TABLET(S): at 20:07

## 2023-10-30 NOTE — PROGRESS NOTE ADULT - ASSESSMENT
A/P: 32yo @33w1d admitted for sPEC by severe range BPs. Maternal and fetal status reassuring.    #sPEC  - criteria met by severe range BPs s/p Magnesium at time of admission 10/11-10/12  - s/p Lab 20 IVP (10/12 ~12a)   - C/w Procardia 60 QD  - BP's overnight 110s-130/70s-80s  - q3 day HELLP labs next due 10/29  - 24h urine protein = 352    #Dyspnea on Exertion  - SpO2 wnl  - EKG wnl  - TTE wnl  - Appreciate CardioOB recs: symptom is likely not cardiac in origin     #Fetal Well Being  - s/p BMZ for fetal lung maturity (10/11-)  - NST BID  - Fetus AGA  - ATU (10/20): vtx, post, JOSE 23, BPP 8/8  - ATU (10/16): vtx, posterior, JOSE 16.22, BPP /8  - ATU (10/13): vtx, post, JOSE 16, BPP 8/8  - ATU (10/11): vtx, posterior placenta, EFW 1587 (45%ile), JOSE 17.34, BPP /  - GCT (): 75  - GBS (10/12): neg    #Maternal Wellbeing  - Con't PNVs  - Reg diet   - HSQ/SCDs for DVT ppx    Laura Koch-Gabino, MS3   A/P: 34yo @33w1d admitted for sPEC by severe range BPs. Maternal and fetal status reassuring overnight.    #sPEC  - criteria met by severe range BPs s/p Magnesium at time of admission 10/11-10/12  - s/p Lab 20 IVP (10/12 ~12a)   - C/w Procardia 60 QD  - BP's overnight 110s-130/70s-80s  - q3 day HELLP labs next due   - 24h urine protein = 352    #Dyspnea on Exertion  - SpO2 wnl  - EKG wnl  - TTE wnl  - Appreciate CardioOB recs: symptom is likely not cardiac in origin     #Fetal Well Being  - s/p BMZ for fetal lung maturity (10/11-)  - NST BID  - Fetus AGA  - ATU (10/26): vtx, post, JOSE 19, BPP 8/8  - ATU (10/20): vtx, post, JOSE 23, BPP 8/8  - ATU (10/16): vtx, posterior, JOSE 16.22, BPP 8/8  - ATU (10/13): vtx, post, JOSE 16, BPP 8/8  - ATU (10/11): vtx, posterior placenta, EFW 1587 (45%ile), JOSE 17.34, BPP 8/8  - GCT (): 75  - GBS (10/12): neg    #Maternal Wellbeing  - Con't PNVs  - Reg diet   - HSQ/SCDs for DVT ppx  - TOD/MOD: IOL@34w    Laura Garcia, MS3    Note reviewed by me and addendum made where necessary  Helen Leon  PGY3

## 2023-10-30 NOTE — PROGRESS NOTE ADULT - SUBJECTIVE AND OBJECTIVE BOX
R3 Antepartum Note  Patient seen and examined at bedside, no acute overnight events. No acute complaints. Pt reports +FM, denies LOF, VB, ctx, HA, epigastric pain, blurred vision, CP, SOB, N/V, fevers, and chills.    Vital Signs Last 24 Hours  T(C): 36.7 (10-30-23 @ 05:47), Max: 36.9 (10-29-23 @ 13:18)  HR: 80 (10-30-23 @ 05:47) (70 - 86)  BP: 116/74 (10-30-23 @ 05:47) (116/74 - 138/80)  RR: 18 (10-30-23 @ 05:47) (18 - 18)  SpO2: 96% (10-30-23 @ 05:47) (96% - 98%)    CAPILLARY BLOOD GLUCOSE          Physical Exam:  General: NAD  Abdomen: Soft, non-tender, gravid  Ext: No pain or swelling    NST reactive overnight    Labs:             11.6   8.41  )-----------( 214      ( 10-30 @ 06:11 )             34.2     10-30 @ 06:11    137  |  102  |  6   ----------------------------<  79  4.0   |  22  |  0.46    Ca    9.4      10-30 @ 06:11    TPro  6.7  /  Alb  3.4  /  TBili  0.2  /  DBili  x   /  AST  17  /  ALT  20  /  AlkPhos  148  10-30 @ 06:11    PT/INR - ( 10-30 @ 06:11 )   PT: 9.6 sec;   INR: 0.87 ratio    PTT - ( 10-30 @ 06:11 )  PTT:28.6 sec    Uric Acid: (10-30 @ 06:11)  5.7      Fibrinogen: (10-30 @ 06:11)  --       LDH: (10-30 @ 06:11)  125        MEDICATIONS  (STANDING):  heparin   Injectable 5000 Unit(s) SubCutaneous every 12 hours  NIFEdipine XL 60 milliGRAM(s) Oral daily  prenatal multivitamin 1 Tablet(s) Oral daily  sertraline 200 milliGRAM(s) Oral daily    MEDICATIONS  (PRN):  acetaminophen 325 mG/butalbital 50 mG/caffeine 40 mG 1 Tablet(s) Oral every 6 hours PRN headache  calcium carbonate    500 mG (Tums) Chewable 2 Tablet(s) Chew three times a day PRN Heartburn  diphenhydrAMINE 25 milliGRAM(s) Oral every 4 hours PRN Rash and/or Itching   R3 Antepartum Note    Patient seen and examined at bedside, no acute overnight events. No acute complaints. Pt reports +FM, denies LOF, VB, ctx, HA, epigastric pain, blurred vision, CP, SOB, N/V, fevers, and chills.    Vital Signs Last 24 Hours  T(C): 36.7 (10-30-23 @ 05:47), Max: 36.9 (10-29-23 @ 13:18)  HR: 80 (10-30-23 @ 05:47) (70 - 86)  BP: 116/74 (10-30-23 @ 05:47) (116/74 - 138/80)  RR: 18 (10-30-23 @ 05:47) (18 - 18)  SpO2: 96% (10-30-23 @ 05:47) (96% - 98%)    CAPILLARY BLOOD GLUCOSE    Physical Exam:  General: NAD  Abdomen: Soft, non-tender, gravid  Ext: No pain or swelling    NST reactive overnight    Labs:             11.6   8.41  )-----------( 214      ( 10-30 @ 06:11 )             34.2     10-30 @ 06:11    137  |  102  |  6   ----------------------------<  79  4.0   |  22  |  0.46    Ca    9.4      10-30 @ 06:11    TPro  6.7  /  Alb  3.4  /  TBili  0.2  /  DBili  x   /  AST  17  /  ALT  20  /  AlkPhos  148  10-30 @ 06:11    PT/INR - ( 10-30 @ 06:11 )   PT: 9.6 sec;   INR: 0.87 ratio    PTT - ( 10-30 @ 06:11 )  PTT:28.6 sec    Uric Acid: (10-30 @ 06:11)  5.7      Fibrinogen: (10-30 @ 06:11)  --       LDH: (10-30 @ 06:11)  125      MEDICATIONS  (STANDING):  heparin   Injectable 5000 Unit(s) SubCutaneous every 12 hours  NIFEdipine XL 60 milliGRAM(s) Oral daily  prenatal multivitamin 1 Tablet(s) Oral daily  sertraline 200 milliGRAM(s) Oral daily    MEDICATIONS  (PRN):  acetaminophen 325 mG/butalbital 50 mG/caffeine 40 mG 1 Tablet(s) Oral every 6 hours PRN headache  calcium carbonate    500 mG (Tums) Chewable 2 Tablet(s) Chew three times a day PRN Heartburn  diphenhydrAMINE 25 milliGRAM(s) Oral every 4 hours PRN Rash and/or Itching

## 2023-10-30 NOTE — PROGRESS NOTE ADULT - ATTENDING COMMENTS
Patient assessed, reports feeling well without toxic complaints.  Reports fetal movement, denies VB, abdominal pain or leakage of fluid.   Currently on Procardia 60mg XL daily  -140s/70-80s without severe BP elevations   Reassuring fetal status   Labs reviewed, P/C ratio 0.2   A/P: IUP at 33+wks, severe pre-eclampsia undergoing inpatient management   -agree with assessment and plan   -continue BP management with Procardia XL   -plan for induction of labor at 34wks   -continue BID fetal monitoring and vitals     JUNITO Oseguera

## 2023-10-31 ENCOUNTER — TRANSCRIPTION ENCOUNTER (OUTPATIENT)
Age: 33
End: 2023-10-31

## 2023-10-31 RX ORDER — DIPHENHYDRAMINE HCL 50 MG
50 CAPSULE ORAL ONCE
Refills: 0 | Status: COMPLETED | OUTPATIENT
Start: 2023-10-31 | End: 2023-10-31

## 2023-10-31 RX ORDER — METOCLOPRAMIDE HCL 10 MG
10 TABLET ORAL ONCE
Refills: 0 | Status: COMPLETED | OUTPATIENT
Start: 2023-10-31 | End: 2023-10-31

## 2023-10-31 RX ADMIN — Medication 60 MILLIGRAM(S): at 05:23

## 2023-10-31 RX ADMIN — HEPARIN SODIUM 5000 UNIT(S): 5000 INJECTION INTRAVENOUS; SUBCUTANEOUS at 20:31

## 2023-10-31 RX ADMIN — Medication 2 TABLET(S): at 11:58

## 2023-10-31 RX ADMIN — Medication 10 MILLIGRAM(S): at 23:57

## 2023-10-31 RX ADMIN — Medication 1 TABLET(S): at 20:31

## 2023-10-31 RX ADMIN — Medication 1 TABLET(S): at 20:06

## 2023-10-31 RX ADMIN — Medication 50 MILLIGRAM(S): at 23:57

## 2023-10-31 RX ADMIN — HEPARIN SODIUM 5000 UNIT(S): 5000 INJECTION INTRAVENOUS; SUBCUTANEOUS at 08:37

## 2023-10-31 RX ADMIN — SERTRALINE 200 MILLIGRAM(S): 25 TABLET, FILM COATED ORAL at 20:31

## 2023-10-31 NOTE — PROGRESS NOTE ADULT - REASON FOR ADMISSION
preeclampsia with severe features

## 2023-10-31 NOTE — PROGRESS NOTE ADULT - ASSESSMENT
34 yo  at 33.2 admitted for management of preeclampsia with severe features.    PECwSF  - s/p magnesium sulfate gtt 10/11-12  - BPs well controlled on Procardia 60 mg XL daily  - HELLP labs q3 days  - Plan for IOL at 34 weeks    Fetal Well-Being  - Betamethasone course 10/11-12  - NST BID

## 2023-10-31 NOTE — PROGRESS NOTE ADULT - SUBJECTIVE AND OBJECTIVE BOX
Patient seen and examined at bedside for morning rounds. She is doing well and remains asymptomatic. Endorses good fetal movement.       Vital Signs Last 24 Hrs  T(C): 36.6 (31 Oct 2023 08:27), Max: 36.7 (30 Oct 2023 13:14)  T(F): 97.8 (31 Oct 2023 08:27), Max: 98.1 (30 Oct 2023 13:14)  HR: 77 (31 Oct 2023 08:27) (76 - 86)  BP: 127/85 (31 Oct 2023 08:27) (127/85 - 137/83)  BP(mean): --  RR: 18 (31 Oct 2023 08:27) (18 - 18)  SpO2: 96% (31 Oct 2023 08:27) (96% - 98%)    Parameters below as of 31 Oct 2023 08:27  Patient On (Oxygen Delivery Method): room air    Gen: NAD  Chest: breathing comfortably on room air  Abd: gravid    CBC Full  -  ( 30 Oct 2023 06:11 )  WBC Count : 8.41 K/uL  RBC Count : 4.05 M/uL  Hemoglobin : 11.6 g/dL  Hematocrit : 34.2 %  Platelet Count - Automated : 214 K/uL      Auto Monocyte # : 0.38 K/uL  Auto Eosinophil # : 0.13 K/uL  Auto Basophil # : 0.04 K/uL  Auto Neutrophil % : 67.9 %  Auto Lymphocyte % : 24.6 %  Auto Monocyte % : 4.5 %  Auto Eosinophil % : 1.5 %  Auto Basophil % : 0.5 %

## 2023-10-31 NOTE — PROGRESS NOTE ADULT - ASSESSMENT
A/P: 32yo @33w2d admitted for sPEC by severe range BPs. Maternal and fetal status reassuring overnight.    #sPEC  - criteria met by severe range BPs s/p Magnesium at time of admission 10/11-10/12  - s/p Lab 20 IVP (10/12 ~12a)   - C/w Procardia 60 QD  - BP's overnight 110s-130/70s-80s  - q3 day HELLP labs next due   - 24h urine protein = 352    #Dyspnea on Exertion  - SpO2 wnl  - EKG wnl  - TTE wnl  - Appreciate CardioOB recs: symptom is likely not cardiac in origin     #Fetal Well Being  - s/p BMZ for fetal lung maturity (10/11-)  - NST BID  - Fetus AGA  - ATU (10/26): vtx, post, JOSE 19, BPP 8/8  - ATU (10/20): vtx, post, JOSE 23, BPP 8/8  - ATU (10/16): vtx, posterior, JOSE 16.22, BPP 8/8  - ATU (10/13): vtx, post, JOSE 16, BPP 8/8  - ATU (10/11): vtx, posterior placenta, EFW 1587 (45%ile), JOSE 17.34, BPP 8/8  - GCT (): 75  - GBS (10/12): neg    #Maternal Wellbeing  - Con't PNVs  - Reg diet   - HSQ/SCDs for DVT ppx  - TOD/MOD: IOL@34w    Laura Garcia, MS3    Note reviewed by me and addendum made where necessary  Helen Leon  PGY3 A/P: 34yo @33w2d admitted for sPEC by severe range BPs. Maternal and fetal status reassuring overnight.    #sPEC  - criteria met by severe range BPs s/p Magnesium at time of admission 10/11-10/12  - s/p Lab 20 IVP (10/12 ~12a)   - C/w Procardia 60 QD  - BP's overnight 130/70s-80s  - q3 day HELLP labs next due   - 24h urine protein = 352    #Dyspnea on Exertion  - SpO2 wnl  - EKG wnl  - TTE wnl  - Appreciate CardioOB recs: symptom is likely not cardiac in origin     #Fetal Well Being  - s/p BMZ for fetal lung maturity (10/11-)  - NST BID  - Fetus AGA  - ATU (10/26): vtx, post, JOSE 19, BPP 8/8  - ATU (10/20): vtx, post, JOSE 23, BPP 8/8  - ATU (10/16): vtx, posterior, JOSE 16.22, BPP 8/8  - ATU (10/13): vtx, post, JOSE 16, BPP 8/8  - ATU (10/11): vtx, posterior placenta, EFW 1587 (45%ile), JOSE 17.34, BPP 8/8  - GCT (): 75  - GBS (10/12): neg    #Maternal Wellbeing  - Con't PNVs  - Reg diet   - HSQ/SCDs for DVT ppx  - TOD/MOD: IOL@34w    Laura Koch-Gabino, MS3    Note reviewed by me and addendum made where necessary  Helen Leon  PGY3

## 2023-10-31 NOTE — PROGRESS NOTE ADULT - SUBJECTIVE AND OBJECTIVE BOX
R3 Antepartum Note  Patient seen and examined at bedside, no acute overnight events. No acute complaints. Pt reports +FM, denies LOF, VB, ctx, HA, epigastric pain, blurred vision, CP, SOB, N/V, fevers, and chills.    Vital Signs Last 24 Hours  T(C): 36.7 (10-31-23 @ 05:23), Max: 36.7 (10-30-23 @ 13:14)  HR: 78 (10-31-23 @ 05:23) (76 - 87)  BP: 131/83 (10-31-23 @ 05:23) (131/83 - 145/83)  RR: 18 (10-31-23 @ 05:23) (18 - 18)  SpO2: 98% (10-31-23 @ 05:23) (96% - 98%)    CAPILLARY BLOOD GLUCOSE          Physical Exam:  General: NAD  Abdomen: Soft, non-tender, gravid  Ext: No pain or swelling    NST reactive overnight    Labs:             11.6   8.41  )-----------( 214      ( 10-30 @ 06:11 )             34.2     10-30 @ 06:11    137  |  102  |  6   ----------------------------<  79  4.0   |  22  |  0.46    Ca    9.4      10-30 @ 06:11    TPro  6.7  /  Alb  3.4  /  TBili  0.2  /  DBili  x   /  AST  17  /  ALT  20  /  AlkPhos  148  10-30 @ 06:11    PT/INR - ( 10-30 @ 06:11 )   PT: 9.6 sec;   INR: 0.87 ratio    PTT - ( 10-30 @ 06:11 )  PTT:28.6 sec    Uric Acid: (10-30 @ 06:11)  5.7      Fibrinogen: (10-30 @ 06:11)  --       LDH: (10-30 @ 06:11)  125        MEDICATIONS  (STANDING):  heparin   Injectable 5000 Unit(s) SubCutaneous every 12 hours  NIFEdipine XL 60 milliGRAM(s) Oral daily  prenatal multivitamin 1 Tablet(s) Oral daily  sertraline 200 milliGRAM(s) Oral daily    MEDICATIONS  (PRN):  acetaminophen 325 mG/butalbital 50 mG/caffeine 40 mG 1 Tablet(s) Oral every 6 hours PRN headache  calcium carbonate    500 mG (Tums) Chewable 2 Tablet(s) Chew three times a day PRN Heartburn  diphenhydrAMINE 25 milliGRAM(s) Oral every 4 hours PRN Rash and/or Itching   R3 Antepartum Note    Patient seen and examined at bedside, no acute overnight events. No acute complaints. Pt reports +FM, denies LOF, VB, ctx, HA, epigastric pain, blurred vision, CP, SOB, N/V, fevers, and chills.    Vital Signs Last 24 Hours  T(C): 36.7 (10-31-23 @ 05:23), Max: 36.7 (10-30-23 @ 13:14)  HR: 78 (10-31-23 @ 05:23) (76 - 87)  BP: 131/83 (10-31-23 @ 05:23) (131/83 - 145/83)  RR: 18 (10-31-23 @ 05:23) (18 - 18)  SpO2: 98% (10-31-23 @ 05:23) (96% - 98%)    CAPILLARY BLOOD GLUCOSE    Physical Exam:  General: NAD  Abdomen: Soft, non-tender, gravid  Ext: No pain or swelling    NST reactive overnight    Labs:             11.6   8.41  )-----------( 214      ( 10-30 @ 06:11 )             34.2     10-30 @ 06:11    137  |  102  |  6   ----------------------------<  79  4.0   |  22  |  0.46    Ca    9.4      10-30 @ 06:11    TPro  6.7  /  Alb  3.4  /  TBili  0.2  /  DBili  x   /  AST  17  /  ALT  20  /  AlkPhos  148  10-30 @ 06:11    PT/INR - ( 10-30 @ 06:11 )   PT: 9.6 sec;   INR: 0.87 ratio    PTT - ( 10-30 @ 06:11 )  PTT:28.6 sec    Uric Acid: (10-30 @ 06:11)  5.7      Fibrinogen: (10-30 @ 06:11)  --       LDH: (10-30 @ 06:11)  125      MEDICATIONS  (STANDING):  heparin   Injectable 5000 Unit(s) SubCutaneous every 12 hours  NIFEdipine XL 60 milliGRAM(s) Oral daily  prenatal multivitamin 1 Tablet(s) Oral daily  sertraline 200 milliGRAM(s) Oral daily    MEDICATIONS  (PRN):  acetaminophen 325 mG/butalbital 50 mG/caffeine 40 mG 1 Tablet(s) Oral every 6 hours PRN headache  calcium carbonate    500 mG (Tums) Chewable 2 Tablet(s) Chew three times a day PRN Heartburn  diphenhydrAMINE 25 milliGRAM(s) Oral every 4 hours PRN Rash and/or Itching

## 2023-11-01 ENCOUNTER — TRANSCRIPTION ENCOUNTER (OUTPATIENT)
Age: 33
End: 2023-11-01

## 2023-11-01 LAB
ALBUMIN SERPL ELPH-MCNC: 3.6 G/DL — SIGNIFICANT CHANGE UP (ref 3.3–5)
ALP SERPL-CCNC: 158 U/L — HIGH (ref 40–120)
ALP SERPL-CCNC: 158 U/L — HIGH (ref 40–120)
ALP SERPL-CCNC: 165 U/L — HIGH (ref 40–120)
ALP SERPL-CCNC: 165 U/L — HIGH (ref 40–120)
ALT FLD-CCNC: 18 U/L — SIGNIFICANT CHANGE UP (ref 10–45)
ALT FLD-CCNC: 18 U/L — SIGNIFICANT CHANGE UP (ref 10–45)
ALT FLD-CCNC: 21 U/L — SIGNIFICANT CHANGE UP (ref 10–45)
ALT FLD-CCNC: 21 U/L — SIGNIFICANT CHANGE UP (ref 10–45)
ANION GAP SERPL CALC-SCNC: 14 MMOL/L — SIGNIFICANT CHANGE UP (ref 5–17)
ANION GAP SERPL CALC-SCNC: 14 MMOL/L — SIGNIFICANT CHANGE UP (ref 5–17)
ANION GAP SERPL CALC-SCNC: 15 MMOL/L — SIGNIFICANT CHANGE UP (ref 5–17)
ANION GAP SERPL CALC-SCNC: 15 MMOL/L — SIGNIFICANT CHANGE UP (ref 5–17)
APTT BLD: 28 SEC — SIGNIFICANT CHANGE UP (ref 24.5–35.6)
APTT BLD: 28 SEC — SIGNIFICANT CHANGE UP (ref 24.5–35.6)
APTT BLD: 28.9 SEC — SIGNIFICANT CHANGE UP (ref 24.5–35.6)
APTT BLD: 28.9 SEC — SIGNIFICANT CHANGE UP (ref 24.5–35.6)
AST SERPL-CCNC: 15 U/L — SIGNIFICANT CHANGE UP (ref 10–40)
AST SERPL-CCNC: 15 U/L — SIGNIFICANT CHANGE UP (ref 10–40)
AST SERPL-CCNC: 19 U/L — SIGNIFICANT CHANGE UP (ref 10–40)
AST SERPL-CCNC: 19 U/L — SIGNIFICANT CHANGE UP (ref 10–40)
BASOPHILS # BLD AUTO: 0.03 K/UL — SIGNIFICANT CHANGE UP (ref 0–0.2)
BASOPHILS # BLD AUTO: 0.03 K/UL — SIGNIFICANT CHANGE UP (ref 0–0.2)
BASOPHILS # BLD AUTO: 0.04 K/UL — SIGNIFICANT CHANGE UP (ref 0–0.2)
BASOPHILS # BLD AUTO: 0.04 K/UL — SIGNIFICANT CHANGE UP (ref 0–0.2)
BASOPHILS NFR BLD AUTO: 0.3 % — SIGNIFICANT CHANGE UP (ref 0–2)
BASOPHILS NFR BLD AUTO: 0.3 % — SIGNIFICANT CHANGE UP (ref 0–2)
BASOPHILS NFR BLD AUTO: 0.4 % — SIGNIFICANT CHANGE UP (ref 0–2)
BASOPHILS NFR BLD AUTO: 0.4 % — SIGNIFICANT CHANGE UP (ref 0–2)
BILIRUB SERPL-MCNC: 0.1 MG/DL — LOW (ref 0.2–1.2)
BILIRUB SERPL-MCNC: 0.1 MG/DL — LOW (ref 0.2–1.2)
BILIRUB SERPL-MCNC: 0.2 MG/DL — SIGNIFICANT CHANGE UP (ref 0.2–1.2)
BILIRUB SERPL-MCNC: 0.2 MG/DL — SIGNIFICANT CHANGE UP (ref 0.2–1.2)
BLD GP AB SCN SERPL QL: NEGATIVE — SIGNIFICANT CHANGE UP
BLD GP AB SCN SERPL QL: NEGATIVE — SIGNIFICANT CHANGE UP
BUN SERPL-MCNC: 5 MG/DL — LOW (ref 7–23)
BUN SERPL-MCNC: 5 MG/DL — LOW (ref 7–23)
BUN SERPL-MCNC: 6 MG/DL — LOW (ref 7–23)
BUN SERPL-MCNC: 6 MG/DL — LOW (ref 7–23)
CALCIUM SERPL-MCNC: 8.5 MG/DL — SIGNIFICANT CHANGE UP (ref 8.4–10.5)
CALCIUM SERPL-MCNC: 8.5 MG/DL — SIGNIFICANT CHANGE UP (ref 8.4–10.5)
CALCIUM SERPL-MCNC: 9.4 MG/DL — SIGNIFICANT CHANGE UP (ref 8.4–10.5)
CALCIUM SERPL-MCNC: 9.4 MG/DL — SIGNIFICANT CHANGE UP (ref 8.4–10.5)
CHLORIDE SERPL-SCNC: 100 MMOL/L — SIGNIFICANT CHANGE UP (ref 96–108)
CHLORIDE SERPL-SCNC: 100 MMOL/L — SIGNIFICANT CHANGE UP (ref 96–108)
CHLORIDE SERPL-SCNC: 94 MMOL/L — LOW (ref 96–108)
CHLORIDE SERPL-SCNC: 94 MMOL/L — LOW (ref 96–108)
CO2 SERPL-SCNC: 20 MMOL/L — LOW (ref 22–31)
CREAT SERPL-MCNC: 0.48 MG/DL — LOW (ref 0.5–1.3)
CREAT SERPL-MCNC: 0.48 MG/DL — LOW (ref 0.5–1.3)
CREAT SERPL-MCNC: 0.51 MG/DL — SIGNIFICANT CHANGE UP (ref 0.5–1.3)
CREAT SERPL-MCNC: 0.51 MG/DL — SIGNIFICANT CHANGE UP (ref 0.5–1.3)
EGFR: 128 ML/MIN/1.73M2 — SIGNIFICANT CHANGE UP
EGFR: 128 ML/MIN/1.73M2 — SIGNIFICANT CHANGE UP
EGFR: 130 ML/MIN/1.73M2 — SIGNIFICANT CHANGE UP
EGFR: 130 ML/MIN/1.73M2 — SIGNIFICANT CHANGE UP
EOSINOPHIL # BLD AUTO: 0.01 K/UL — SIGNIFICANT CHANGE UP (ref 0–0.5)
EOSINOPHIL # BLD AUTO: 0.01 K/UL — SIGNIFICANT CHANGE UP (ref 0–0.5)
EOSINOPHIL # BLD AUTO: 0.04 K/UL — SIGNIFICANT CHANGE UP (ref 0–0.5)
EOSINOPHIL # BLD AUTO: 0.04 K/UL — SIGNIFICANT CHANGE UP (ref 0–0.5)
EOSINOPHIL NFR BLD AUTO: 0.1 % — SIGNIFICANT CHANGE UP (ref 0–6)
EOSINOPHIL NFR BLD AUTO: 0.1 % — SIGNIFICANT CHANGE UP (ref 0–6)
EOSINOPHIL NFR BLD AUTO: 0.4 % — SIGNIFICANT CHANGE UP (ref 0–6)
EOSINOPHIL NFR BLD AUTO: 0.4 % — SIGNIFICANT CHANGE UP (ref 0–6)
FIBRINOGEN PPP-MCNC: 564 MG/DL — HIGH (ref 200–445)
FIBRINOGEN PPP-MCNC: 564 MG/DL — HIGH (ref 200–445)
FIBRINOGEN PPP-MCNC: 663 MG/DL — HIGH (ref 200–445)
FIBRINOGEN PPP-MCNC: 663 MG/DL — HIGH (ref 200–445)
GLUCOSE SERPL-MCNC: 88 MG/DL — SIGNIFICANT CHANGE UP (ref 70–99)
GLUCOSE SERPL-MCNC: 88 MG/DL — SIGNIFICANT CHANGE UP (ref 70–99)
GLUCOSE SERPL-MCNC: 92 MG/DL — SIGNIFICANT CHANGE UP (ref 70–99)
GLUCOSE SERPL-MCNC: 92 MG/DL — SIGNIFICANT CHANGE UP (ref 70–99)
HCT VFR BLD CALC: 34.2 % — LOW (ref 34.5–45)
HCT VFR BLD CALC: 34.2 % — LOW (ref 34.5–45)
HCT VFR BLD CALC: 35.5 % — SIGNIFICANT CHANGE UP (ref 34.5–45)
HCT VFR BLD CALC: 35.5 % — SIGNIFICANT CHANGE UP (ref 34.5–45)
HGB BLD-MCNC: 11.5 G/DL — SIGNIFICANT CHANGE UP (ref 11.5–15.5)
HGB BLD-MCNC: 11.5 G/DL — SIGNIFICANT CHANGE UP (ref 11.5–15.5)
HGB BLD-MCNC: 11.9 G/DL — SIGNIFICANT CHANGE UP (ref 11.5–15.5)
HGB BLD-MCNC: 11.9 G/DL — SIGNIFICANT CHANGE UP (ref 11.5–15.5)
IMM GRANULOCYTES NFR BLD AUTO: 0.7 % — SIGNIFICANT CHANGE UP (ref 0–0.9)
IMM GRANULOCYTES NFR BLD AUTO: 0.7 % — SIGNIFICANT CHANGE UP (ref 0–0.9)
IMM GRANULOCYTES NFR BLD AUTO: 0.8 % — SIGNIFICANT CHANGE UP (ref 0–0.9)
IMM GRANULOCYTES NFR BLD AUTO: 0.8 % — SIGNIFICANT CHANGE UP (ref 0–0.9)
INR BLD: 0.87 RATIO — SIGNIFICANT CHANGE UP (ref 0.85–1.18)
INR BLD: 0.87 RATIO — SIGNIFICANT CHANGE UP (ref 0.85–1.18)
INR BLD: 0.9 RATIO — SIGNIFICANT CHANGE UP (ref 0.85–1.18)
INR BLD: 0.9 RATIO — SIGNIFICANT CHANGE UP (ref 0.85–1.18)
LDH SERPL L TO P-CCNC: 128 U/L — SIGNIFICANT CHANGE UP (ref 50–242)
LDH SERPL L TO P-CCNC: 128 U/L — SIGNIFICANT CHANGE UP (ref 50–242)
LDH SERPL L TO P-CCNC: 133 U/L — SIGNIFICANT CHANGE UP (ref 50–242)
LDH SERPL L TO P-CCNC: 133 U/L — SIGNIFICANT CHANGE UP (ref 50–242)
LYMPHOCYTES # BLD AUTO: 0.78 K/UL — LOW (ref 1–3.3)
LYMPHOCYTES # BLD AUTO: 0.78 K/UL — LOW (ref 1–3.3)
LYMPHOCYTES # BLD AUTO: 1.03 K/UL — SIGNIFICANT CHANGE UP (ref 1–3.3)
LYMPHOCYTES # BLD AUTO: 1.03 K/UL — SIGNIFICANT CHANGE UP (ref 1–3.3)
LYMPHOCYTES # BLD AUTO: 10.4 % — LOW (ref 13–44)
LYMPHOCYTES # BLD AUTO: 10.4 % — LOW (ref 13–44)
LYMPHOCYTES # BLD AUTO: 7.6 % — LOW (ref 13–44)
LYMPHOCYTES # BLD AUTO: 7.6 % — LOW (ref 13–44)
MAGNESIUM SERPL-MCNC: 5 MG/DL — HIGH (ref 1.6–2.6)
MAGNESIUM SERPL-MCNC: 5 MG/DL — HIGH (ref 1.6–2.6)
MCHC RBC-ENTMCNC: 28.1 PG — SIGNIFICANT CHANGE UP (ref 27–34)
MCHC RBC-ENTMCNC: 28.1 PG — SIGNIFICANT CHANGE UP (ref 27–34)
MCHC RBC-ENTMCNC: 28.3 PG — SIGNIFICANT CHANGE UP (ref 27–34)
MCHC RBC-ENTMCNC: 28.3 PG — SIGNIFICANT CHANGE UP (ref 27–34)
MCHC RBC-ENTMCNC: 33.5 GM/DL — SIGNIFICANT CHANGE UP (ref 32–36)
MCHC RBC-ENTMCNC: 33.5 GM/DL — SIGNIFICANT CHANGE UP (ref 32–36)
MCHC RBC-ENTMCNC: 33.6 GM/DL — SIGNIFICANT CHANGE UP (ref 32–36)
MCHC RBC-ENTMCNC: 33.6 GM/DL — SIGNIFICANT CHANGE UP (ref 32–36)
MCV RBC AUTO: 83.6 FL — SIGNIFICANT CHANGE UP (ref 80–100)
MCV RBC AUTO: 83.6 FL — SIGNIFICANT CHANGE UP (ref 80–100)
MCV RBC AUTO: 84.5 FL — SIGNIFICANT CHANGE UP (ref 80–100)
MCV RBC AUTO: 84.5 FL — SIGNIFICANT CHANGE UP (ref 80–100)
MONOCYTES # BLD AUTO: 0.41 K/UL — SIGNIFICANT CHANGE UP (ref 0–0.9)
MONOCYTES # BLD AUTO: 0.41 K/UL — SIGNIFICANT CHANGE UP (ref 0–0.9)
MONOCYTES # BLD AUTO: 0.45 K/UL — SIGNIFICANT CHANGE UP (ref 0–0.9)
MONOCYTES # BLD AUTO: 0.45 K/UL — SIGNIFICANT CHANGE UP (ref 0–0.9)
MONOCYTES NFR BLD AUTO: 4 % — SIGNIFICANT CHANGE UP (ref 2–14)
MONOCYTES NFR BLD AUTO: 4 % — SIGNIFICANT CHANGE UP (ref 2–14)
MONOCYTES NFR BLD AUTO: 4.5 % — SIGNIFICANT CHANGE UP (ref 2–14)
MONOCYTES NFR BLD AUTO: 4.5 % — SIGNIFICANT CHANGE UP (ref 2–14)
NEUTROPHILS # BLD AUTO: 8.31 K/UL — HIGH (ref 1.8–7.4)
NEUTROPHILS # BLD AUTO: 8.31 K/UL — HIGH (ref 1.8–7.4)
NEUTROPHILS # BLD AUTO: 8.98 K/UL — HIGH (ref 1.8–7.4)
NEUTROPHILS # BLD AUTO: 8.98 K/UL — HIGH (ref 1.8–7.4)
NEUTROPHILS NFR BLD AUTO: 83.6 % — HIGH (ref 43–77)
NEUTROPHILS NFR BLD AUTO: 83.6 % — HIGH (ref 43–77)
NEUTROPHILS NFR BLD AUTO: 87.2 % — HIGH (ref 43–77)
NEUTROPHILS NFR BLD AUTO: 87.2 % — HIGH (ref 43–77)
NRBC # BLD: 0 /100 WBCS — SIGNIFICANT CHANGE UP (ref 0–0)
PLATELET # BLD AUTO: 208 K/UL — SIGNIFICANT CHANGE UP (ref 150–400)
PLATELET # BLD AUTO: 208 K/UL — SIGNIFICANT CHANGE UP (ref 150–400)
PLATELET # BLD AUTO: 231 K/UL — SIGNIFICANT CHANGE UP (ref 150–400)
PLATELET # BLD AUTO: 231 K/UL — SIGNIFICANT CHANGE UP (ref 150–400)
POTASSIUM SERPL-MCNC: 3.9 MMOL/L — SIGNIFICANT CHANGE UP (ref 3.5–5.3)
POTASSIUM SERPL-MCNC: 3.9 MMOL/L — SIGNIFICANT CHANGE UP (ref 3.5–5.3)
POTASSIUM SERPL-MCNC: 4 MMOL/L — SIGNIFICANT CHANGE UP (ref 3.5–5.3)
POTASSIUM SERPL-MCNC: 4 MMOL/L — SIGNIFICANT CHANGE UP (ref 3.5–5.3)
POTASSIUM SERPL-SCNC: 3.9 MMOL/L — SIGNIFICANT CHANGE UP (ref 3.5–5.3)
POTASSIUM SERPL-SCNC: 3.9 MMOL/L — SIGNIFICANT CHANGE UP (ref 3.5–5.3)
POTASSIUM SERPL-SCNC: 4 MMOL/L — SIGNIFICANT CHANGE UP (ref 3.5–5.3)
POTASSIUM SERPL-SCNC: 4 MMOL/L — SIGNIFICANT CHANGE UP (ref 3.5–5.3)
PROT SERPL-MCNC: 6.8 G/DL — SIGNIFICANT CHANGE UP (ref 6–8.3)
PROT SERPL-MCNC: 6.8 G/DL — SIGNIFICANT CHANGE UP (ref 6–8.3)
PROT SERPL-MCNC: 6.9 G/DL — SIGNIFICANT CHANGE UP (ref 6–8.3)
PROT SERPL-MCNC: 6.9 G/DL — SIGNIFICANT CHANGE UP (ref 6–8.3)
PROTHROM AB SERPL-ACNC: 9.6 SEC — SIGNIFICANT CHANGE UP (ref 9.5–13)
PROTHROM AB SERPL-ACNC: 9.6 SEC — SIGNIFICANT CHANGE UP (ref 9.5–13)
PROTHROM AB SERPL-ACNC: 9.9 SEC — SIGNIFICANT CHANGE UP (ref 9.5–13)
PROTHROM AB SERPL-ACNC: 9.9 SEC — SIGNIFICANT CHANGE UP (ref 9.5–13)
RBC # BLD: 4.09 M/UL — SIGNIFICANT CHANGE UP (ref 3.8–5.2)
RBC # BLD: 4.09 M/UL — SIGNIFICANT CHANGE UP (ref 3.8–5.2)
RBC # BLD: 4.2 M/UL — SIGNIFICANT CHANGE UP (ref 3.8–5.2)
RBC # BLD: 4.2 M/UL — SIGNIFICANT CHANGE UP (ref 3.8–5.2)
RBC # FLD: 13.7 % — SIGNIFICANT CHANGE UP (ref 10.3–14.5)
RH IG SCN BLD-IMP: POSITIVE — SIGNIFICANT CHANGE UP
RH IG SCN BLD-IMP: POSITIVE — SIGNIFICANT CHANGE UP
SODIUM SERPL-SCNC: 129 MMOL/L — LOW (ref 135–145)
SODIUM SERPL-SCNC: 129 MMOL/L — LOW (ref 135–145)
SODIUM SERPL-SCNC: 134 MMOL/L — LOW (ref 135–145)
SODIUM SERPL-SCNC: 134 MMOL/L — LOW (ref 135–145)
URATE SERPL-MCNC: 5.1 MG/DL — SIGNIFICANT CHANGE UP (ref 2.5–7)
URATE SERPL-MCNC: 5.1 MG/DL — SIGNIFICANT CHANGE UP (ref 2.5–7)
URATE SERPL-MCNC: 5.2 MG/DL — SIGNIFICANT CHANGE UP (ref 2.5–7)
URATE SERPL-MCNC: 5.2 MG/DL — SIGNIFICANT CHANGE UP (ref 2.5–7)
WBC # BLD: 10.29 K/UL — SIGNIFICANT CHANGE UP (ref 3.8–10.5)
WBC # BLD: 10.29 K/UL — SIGNIFICANT CHANGE UP (ref 3.8–10.5)
WBC # BLD: 9.94 K/UL — SIGNIFICANT CHANGE UP (ref 3.8–10.5)
WBC # BLD: 9.94 K/UL — SIGNIFICANT CHANGE UP (ref 3.8–10.5)
WBC # FLD AUTO: 10.29 K/UL — SIGNIFICANT CHANGE UP (ref 3.8–10.5)
WBC # FLD AUTO: 10.29 K/UL — SIGNIFICANT CHANGE UP (ref 3.8–10.5)
WBC # FLD AUTO: 9.94 K/UL — SIGNIFICANT CHANGE UP (ref 3.8–10.5)
WBC # FLD AUTO: 9.94 K/UL — SIGNIFICANT CHANGE UP (ref 3.8–10.5)

## 2023-11-01 PROCEDURE — 88307 TISSUE EXAM BY PATHOLOGIST: CPT | Mod: 26

## 2023-11-01 PROCEDURE — 59514 CESAREAN DELIVERY ONLY: CPT | Mod: AS,U7

## 2023-11-01 DEVICE — INTERCEED 3 X 4": Type: IMPLANTABLE DEVICE | Status: FUNCTIONAL

## 2023-11-01 RX ORDER — OXYTOCIN 10 UNIT/ML
333.33 VIAL (ML) INJECTION
Qty: 20 | Refills: 0 | Status: DISCONTINUED | OUTPATIENT
Start: 2023-11-01 | End: 2023-11-05

## 2023-11-01 RX ORDER — MAGNESIUM SULFATE 500 MG/ML
2 VIAL (ML) INJECTION
Qty: 40 | Refills: 0 | Status: DISCONTINUED | OUTPATIENT
Start: 2023-11-01 | End: 2023-11-02

## 2023-11-01 RX ORDER — SODIUM CHLORIDE 9 MG/ML
1000 INJECTION, SOLUTION INTRAVENOUS
Refills: 0 | Status: DISCONTINUED | OUTPATIENT
Start: 2023-11-01 | End: 2023-11-05

## 2023-11-01 RX ORDER — DIPHENHYDRAMINE HCL 50 MG
25 CAPSULE ORAL ONCE
Refills: 0 | Status: COMPLETED | OUTPATIENT
Start: 2023-11-01 | End: 2023-11-01

## 2023-11-01 RX ORDER — MAGNESIUM SULFATE 500 MG/ML
4 VIAL (ML) INJECTION ONCE
Refills: 0 | Status: COMPLETED | OUTPATIENT
Start: 2023-11-01 | End: 2023-11-01

## 2023-11-01 RX ORDER — CHLORHEXIDINE GLUCONATE 213 G/1000ML
1 SOLUTION TOPICAL DAILY
Refills: 0 | Status: DISCONTINUED | OUTPATIENT
Start: 2023-11-01 | End: 2023-11-01

## 2023-11-01 RX ORDER — OXYCODONE HYDROCHLORIDE 5 MG/1
10 TABLET ORAL
Refills: 0 | Status: DISCONTINUED | OUTPATIENT
Start: 2023-11-01 | End: 2023-11-02

## 2023-11-01 RX ORDER — SODIUM CHLORIDE 9 MG/ML
1000 INJECTION, SOLUTION INTRAVENOUS ONCE
Refills: 0 | Status: COMPLETED | OUTPATIENT
Start: 2023-11-01 | End: 2023-11-01

## 2023-11-01 RX ORDER — METOCLOPRAMIDE HCL 10 MG
10 TABLET ORAL ONCE
Refills: 0 | Status: COMPLETED | OUTPATIENT
Start: 2023-11-01 | End: 2023-11-01

## 2023-11-01 RX ORDER — ONDANSETRON 8 MG/1
4 TABLET, FILM COATED ORAL EVERY 6 HOURS
Refills: 0 | Status: DISCONTINUED | OUTPATIENT
Start: 2023-11-01 | End: 2023-11-02

## 2023-11-01 RX ORDER — MAGNESIUM SULFATE 500 MG/ML
4 VIAL (ML) INJECTION ONCE
Refills: 0 | Status: DISCONTINUED | OUTPATIENT
Start: 2023-11-01 | End: 2023-11-01

## 2023-11-01 RX ORDER — MORPHINE SULFATE 50 MG/1
0.1 CAPSULE, EXTENDED RELEASE ORAL ONCE
Refills: 0 | Status: DISCONTINUED | OUTPATIENT
Start: 2023-11-01 | End: 2023-11-02

## 2023-11-01 RX ORDER — NALBUPHINE HYDROCHLORIDE 10 MG/ML
2.5 INJECTION, SOLUTION INTRAMUSCULAR; INTRAVENOUS; SUBCUTANEOUS EVERY 6 HOURS
Refills: 0 | Status: DISCONTINUED | OUTPATIENT
Start: 2023-11-01 | End: 2023-11-02

## 2023-11-01 RX ORDER — CITRIC ACID/SODIUM CITRATE 300-500 MG
15 SOLUTION, ORAL ORAL EVERY 6 HOURS
Refills: 0 | Status: DISCONTINUED | OUTPATIENT
Start: 2023-11-01 | End: 2023-11-01

## 2023-11-01 RX ORDER — SODIUM CHLORIDE 9 MG/ML
1000 INJECTION, SOLUTION INTRAVENOUS
Refills: 0 | Status: DISCONTINUED | OUTPATIENT
Start: 2023-11-01 | End: 2023-11-01

## 2023-11-01 RX ORDER — NALOXONE HYDROCHLORIDE 4 MG/.1ML
0.1 SPRAY NASAL
Refills: 0 | Status: DISCONTINUED | OUTPATIENT
Start: 2023-11-01 | End: 2023-11-02

## 2023-11-01 RX ORDER — ACETAMINOPHEN 500 MG
1000 TABLET ORAL ONCE
Refills: 0 | Status: COMPLETED | OUTPATIENT
Start: 2023-11-01 | End: 2023-11-01

## 2023-11-01 RX ORDER — FAMOTIDINE 10 MG/ML
20 INJECTION INTRAVENOUS ONCE
Refills: 0 | Status: COMPLETED | OUTPATIENT
Start: 2023-11-01 | End: 2023-11-01

## 2023-11-01 RX ORDER — DEXAMETHASONE 0.5 MG/5ML
4 ELIXIR ORAL EVERY 6 HOURS
Refills: 0 | Status: DISCONTINUED | OUTPATIENT
Start: 2023-11-01 | End: 2023-11-02

## 2023-11-01 RX ORDER — IBUPROFEN 200 MG
600 TABLET ORAL EVERY 6 HOURS
Refills: 0 | Status: DISCONTINUED | OUTPATIENT
Start: 2023-11-01 | End: 2023-11-05

## 2023-11-01 RX ORDER — ACETAMINOPHEN 500 MG
975 TABLET ORAL EVERY 6 HOURS
Refills: 0 | Status: DISCONTINUED | OUTPATIENT
Start: 2023-11-01 | End: 2023-11-05

## 2023-11-01 RX ORDER — OXYCODONE HYDROCHLORIDE 5 MG/1
5 TABLET ORAL
Refills: 0 | Status: DISCONTINUED | OUTPATIENT
Start: 2023-11-01 | End: 2023-11-02

## 2023-11-01 RX ORDER — MAGNESIUM SULFATE 500 MG/ML
2 VIAL (ML) INJECTION
Qty: 40 | Refills: 0 | Status: DISCONTINUED | OUTPATIENT
Start: 2023-11-01 | End: 2023-11-01

## 2023-11-01 RX ORDER — HEPARIN SODIUM 5000 [USP'U]/ML
5000 INJECTION INTRAVENOUS; SUBCUTANEOUS EVERY 12 HOURS
Refills: 0 | Status: DISCONTINUED | OUTPATIENT
Start: 2023-11-01 | End: 2023-11-05

## 2023-11-01 RX ADMIN — SODIUM CHLORIDE 1000 MILLILITER(S): 9 INJECTION, SOLUTION INTRAVENOUS at 16:00

## 2023-11-01 RX ADMIN — Medication 12 MILLIGRAM(S): at 08:05

## 2023-11-01 RX ADMIN — SODIUM CHLORIDE 125 MILLILITER(S): 9 INJECTION, SOLUTION INTRAVENOUS at 02:59

## 2023-11-01 RX ADMIN — Medication 50 GM/HR: at 03:21

## 2023-11-01 RX ADMIN — FAMOTIDINE 20 MILLIGRAM(S): 10 INJECTION INTRAVENOUS at 13:32

## 2023-11-01 RX ADMIN — Medication 15 MILLILITER(S): at 13:32

## 2023-11-01 RX ADMIN — Medication 10 MILLIGRAM(S): at 06:54

## 2023-11-01 RX ADMIN — Medication 25 MILLIGRAM(S): at 06:55

## 2023-11-01 RX ADMIN — Medication 1 TABLET(S): at 01:17

## 2023-11-01 RX ADMIN — Medication 50 GM/HR: at 16:31

## 2023-11-01 RX ADMIN — Medication 400 MILLIGRAM(S): at 06:55

## 2023-11-01 RX ADMIN — Medication 300 GRAM(S): at 02:59

## 2023-11-01 RX ADMIN — Medication 975 MILLIGRAM(S): at 23:41

## 2023-11-01 RX ADMIN — Medication 60 MILLIGRAM(S): at 05:04

## 2023-11-01 RX ADMIN — Medication 400 MILLIGRAM(S): at 18:15

## 2023-11-01 RX ADMIN — HEPARIN SODIUM 5000 UNIT(S): 5000 INJECTION INTRAVENOUS; SUBCUTANEOUS at 23:41

## 2023-11-01 NOTE — DISCHARGE NOTE OB - CARE PROVIDER_API CALL
Maryjo Johnson  Obstetrics and Gynecology  [address], [address]  [place] [address]  Phone: [phone]  Fax: [phone]  Follow Up Time: 2 weeks

## 2023-11-01 NOTE — OB PROVIDER DELIVERY SUMMARY - NS_SCHEDBEFORE39_OBGYN_ALL_OB
Patient is returning call. Please try again at      Telephone Information:   Mobile [phone]      Hypertensive Disorder

## 2023-11-01 NOTE — DISCHARGE NOTE OB - PATIENT PORTAL LINK FT
You can access the FollowMyHealth Patient Portal offered by [facility] by registering at the following website: [email or web address]. By joining [employer]’s FollowMyHealth portal, you will also be able to view your health information using other applications (apps) compatible with our system.

## 2023-11-01 NOTE — OB PROVIDER DELIVERY SUMMARY - NSPROVIDERDELIVERYNOTE_OBGYN_ALL_OB_FT
primary low transverse  performed. liveborn male infant delivered in vertex position APGARS 7/9.  weight 1900g. Intercede placed over closed hysterotomy.       IVF 1800   primary low transverse  performed. liveborn male infant delivered in vertex position APGARS 7/9.  weight 1900g. Intercede placed over closed hysterotomy.       IVF 1800      Dictation number [id number] primary low transverse  performed due to preeclampsia with severe features, patient declined IOL. liveborn male infant delivered in vertex position APGARS 7/9.  weight 1900g. Intercede placed over closed hysterotomy.       IVF 1800      Dictation number [id number]

## 2023-11-01 NOTE — OB RN INTRAOPERATIVE NOTE - NS_IMPLANTS_ADDITIONAL IMPLANTS EXPIRATION DATE_OBGYN_ALL_OB_DT
----- Message from Boubacar Marcus MD sent at 4/12/2022 12:43 PM CDT -----  History of liver hemangioma.  LFTs appear to have increased remarkably.  I would recommend repeat hepatic panel in the next 2 weeks.  Auto immune work up suggests possibility of Crest Syndrome which may be a contributing factor for dysphagia. I would recommend Prilosec 40 mg BID and referral to GI.   Inflammatory markers, thyroid function, LDH all appear to be negative.   31-Aug-2027

## 2023-11-01 NOTE — OB RN DELIVERY SUMMARY - NSSELHIDDEN_OBGYN_ALL_OB_FT
[NS_DeliveryAttending1_OBGYN_ALL_OB_FT:[id number]],[NS_DeliveryAssist1_OBGYN_ALL_OB_FT:[id number]],[NS_DeliveryRN_OBGYN_ALL_OB_FT:[id number]]

## 2023-11-01 NOTE — DISCHARGE NOTE OB - CARE PLAN
1 Principal Discharge DX:	 delivery delivered  Assessment and plan of treatment:	After discharge, please stay on pelvic rest for 6 weeks, meaning no sexual intercourse, no tampons and no douching.  No driving for 2 weeks as women can loose a lot of blood during delivery and there is a possibility of being lightheaded/fainting.  No lifting objects heavier than baby for two weeks.  Expect to have vaginal bleeding/spotting for up to six weeks.  The bleeding should get lighter and more white/light brown with time.  For bleeding soaking more than a pad an hour or passing clots greater than the size of your fist, come in to the emergency department.    Follow up in the office in 2 weeks for incision check.  Call your OBGYN for noticeable increase in redness or swelling at incision, discharge from incision, or opening of skin at incision site.  Secondary Diagnosis:	Severe preeclampsia, third trimester  Assessment and plan of treatment:	Continue to take your blood pressure at least twice a day, and take your medications as prescribed.  Call your OB if your pressures are greater than 150/100, or if you experience severe or persistent headaches, visual changes, persistent nausea/vomiting, trouble breathing, chest pain, or severe abdominal pain. Please follow up with your OBGYN within 1 week to review your blood pressures.

## 2023-11-01 NOTE — DISCHARGE NOTE OB - HOSPITAL COURSE
Pt admitted to antipartum service at 30 weeks for preeclampsia with severe features, started on procardia. AT 33 weeks patient had severe range BPs and headache warranting delivery, patient opted for primary  section.  Pt had uncomplicated  section and was continued on magnesium sulfate fr 24h postpartum. Pt admitted to antipartum service at 30 weeks for preeclampsia with severe features, started on procardia. AT 33 weeks patient had severe range BPs and headache warranting delivery, patient opted for primary  section.  Pt had uncomplicated  section and was continued on magnesium sulfate fr 24h postpartum. Her BPs are normal with procardia and labetalol. She is stable for discharge on POD 4.

## 2023-11-01 NOTE — PROVIDER CONTACT NOTE (OTHER) - ACTION/TREATMENT ORDERED:
Dr. Yin at bedside to assess pt and aware of v/s. Draw HELLP labs and obtain another BP 15 mins post previous one. Dr. Yin to contact attending and MFM for next steps.

## 2023-11-01 NOTE — OB PROVIDER LABOR PROGRESS NOTE - NS_SUBJECTIVE/OBJECTIVE_OBGYN_ALL_OB_FT
note    Patien will headache not completely relieved by medications, severe range bps nonsusstained.     Patient declining IOL at this time, for primary  section     rba discussed    nursing and anesthesia aware    Maryjo Johnson MD

## 2023-11-01 NOTE — DISCHARGE NOTE OB - NS MD DC FALL RISK RISK
For information on Fall & Injury Prevention, visit: [email or web address] OR  [email or web address] OR  https://www.cdc.gov/steadi/patient.html

## 2023-11-01 NOTE — OB PROVIDER DELIVERY SUMMARY - NSSELHIDDEN_OBGYN_ALL_OB_FT
[NS_DeliveryAttending1_OBGYN_ALL_OB_FT:[id number]],[NS_DeliveryAssist1_OBGYN_ALL_OB_FT:[id number]=]

## 2023-11-01 NOTE — OB NEONATOLOGY/PEDIATRICIAN DELIVERY SUMMARY - NSPEDSNEONOTESA_OBGYN_ALL_OB_FT
Called by OB to attend primary unscheduled c/s delivery for severe pre-eclampsia. Baby is  product of a 33.3 week gestation born to a ,  33 year old female.  Maternal labs include: Blood Type  O+, HIV neg, RPR neg, Hep B[-], GBS neg on 10/12, rest is unremarkable. Maternal history is significant for anxiety treated with Zoloft. Pregnancy was complicated by severe pre-eclampsia, treated with procardia and magnesium, magnesium level prior to delivery was 5.0 mg/dL.  Received BMZ 10/11, 10/12, . AROM at  time of delivery. Resuscitation included: delayed cord clamping x 30 seconds, brought to warmer, CPAP applied for weak respiratory effort, pulse ox applied and HR >100. Max settings CPAP 5/40% Fio2.  Apgars were: 7/9. EOS score NA due to gestational age. Admit to NICU for prematurity. Consents to hepatitis B vaccine and circumcision.

## 2023-11-01 NOTE — CHART NOTE - NSCHARTNOTEFT_GEN_A_CORE
Note Entry Delayed 2/2 Clinical Duties    Pt with persistent severe range BPs, requiring push of labetalol 20 IVP. Pt exhibiting features of severe preeclampsia. Started on magnesium for seizure prophylaxis & procardia 30mg QD as standing antihypertensive medication. Pt also to receive BMZ for FLM. Plan for 24hr urine protein collection, cardioOB consult, & MFM consult in AM. Discussed diagnosis with pt and partner including pathophysiology, management, & plan for delivery at 34wks. Pt and partner acknowledged understanding. All questions answered at this time.    Kristy, PGY4  D/w Dr. Oseguera
Patient complaining of HA, given tylenol/benadryl/reglan without improvement. BPs at the time were wnl - 110-130/70-80s.     Called again for continued HA. Upon evaluation patient with 8/10 severity HA without blurry vision, RUQ pain, or SOB.     BPs taken at this time 150s/100s.       32yo  at 33+3 with sPEC, now with new onset of severe features - mild range BPs with unrelenting HA.   -STAT HELLP labs drawn.   -Patient to be transferred to L&D for close BP monitoring.   -Consider MFM consultation for possible magnesium for seizure ppx + delivery    Plan per Dr. Mohamud Yin, PGY3
OB PA Note    Called to evaluate patient b/c pt complaining of an episode of nausea, and floaters in her visual field and SOB.  Pt states her symptoms came and went very quickly and now she is feeling completely fine.  Pt is awaiting her lunch to arrive to eat with her mother.  Denies other complaints  Denies HA, visual changes or epigastric pain    T(C): 36.7 (10-20-23 @ 09:00), Max: 36.8 (10-19-23 @ 17:45)  HR: 87 (10-20-23 @ 09:00) (78 - 95)  BP: 136/90 (10-20-23 @ 09:00) (127/76 - 144/76)  RR: 18 (10-20-23 @ 09:00) (18 - 18)  SpO2: 98% (10-20-23 @ 09:00) (95% - 98%)                              12.2   8.49  )-----------( 237      ( 20 Oct 2023 11:20 )             36.0              10-20    137  |  101  |  8   ----------------------------<  162<H>  3.7   |  18<L>  |  0.47<L>    Ca    9.2      20 Oct 2023 11:20    TPro  7.1  /  Alb  3.6  /  TBili  0.2  /  DBili  x   /  AST  13  /  ALT  17  /  AlkPhos  140<H>  10-20         LIVER FUNCTIONS - ( 20 Oct 2023 11:20 )  Alb: 3.6 g/dL / Pro: 7.1 g/dL / ALK PHOS: 140 U/L / ALT: 17 U/L / AST: 13 U/L / GGT: x             PT/INR - ( 20 Oct 2023 11:20 )   PT: 9.8 sec;   INR: 0.93 ratio         PTT - ( 20 Oct 2023 11:20 )  PTT:27.4 sec,  Fibrinogen    Urinalysis Basic - ( 20 Oct 2023 11:20 )    Color: x / Appearance: x / SG: x / pH: x  Gluc: 162 mg/dL / Ketone: x  / Bili: x / Urobili: x   Blood: x / Protein: x / Nitrite: x   Leuk Esterase: x / RBC: x / WBC x   Sq Epi: x / Non Sq Epi: x / Bacteria: x      32yo P0 @ 31w 5 d admitted to ante for sPEC  -pt with short episode of feeling unwell, pt feels much better now and about to eat lunch  - Continue routine antepartum care  D/W Dr Alex Ortiz PA-C

## 2023-11-01 NOTE — OB RN INTRAOPERATIVE NOTE - NSSELHIDDEN_OBGYN_ALL_OB_FT
[NS_DeliveryAttending1_OBGYN_ALL_OB_FT:[id number]],[NS_DeliveryAssist1_OBGYN_ALL_OB_FT:[id number]],[NS_DeliveryRN_OBGYN_ALL_OB_FT:[id number]=]

## 2023-11-01 NOTE — DISCHARGE NOTE OB - MEDICATION SUMMARY - MEDICATIONS TO TAKE
I will START or STAY ON the medications listed below when I get home from the hospital:    ibuprofen 600 mg oral tablet  -- 1 tab(s) by mouth every 6 hours  -- Indication: For pain    acetaminophen 325 mg oral tablet  -- 3 tab(s) by mouth every 6 hours as needed for  moderate pain  -- Indication: For pain    sertraline 100 mg oral tablet  -- 2 tab(s) by mouth once a day  -- Indication: For depression    labetalol 200 mg oral tablet  -- 1 tab(s) by mouth 2 times a day  -- Indication: For hypertension    NIFEdipine 60 mg oral tablet, extended release  -- 1 tab(s) by mouth once a day  -- Indication: For hypertension    Prenatal Multivitamins with Folic Acid 1 mg oral tablet  -- 1 tab(s) by mouth once a day  -- Indication: For wellness

## 2023-11-01 NOTE — PROVIDER CONTACT NOTE (OTHER) - ASSESSMENT
Pt is c/o a 8/10 HA that isn't relieved by medications (benadryl, Reglan and Esgic), feels nauseous and is voiding frequently.  PT claims she does not feel well overall.   BP: 153/109 L. ARM  98.2 F  101 HR  99% O2

## 2023-11-01 NOTE — OB RN DELIVERY SUMMARY - NS_PROPHYLABX_OBGYN_ALL_OB
[FreeTextEntry1] : Patient is well, states pain has resolved.\par \par \par \par incision is C/D/I and well healed\par \par \par f/u pathology\par \par f/u prn Yes

## 2023-11-02 LAB
ALBUMIN SERPL ELPH-MCNC: 3.2 G/DL — LOW (ref 3.3–5)
ALBUMIN SERPL ELPH-MCNC: 3.2 G/DL — LOW (ref 3.3–5)
ALP SERPL-CCNC: 131 U/L — HIGH (ref 40–120)
ALP SERPL-CCNC: 131 U/L — HIGH (ref 40–120)
ALT FLD-CCNC: 17 U/L — SIGNIFICANT CHANGE UP (ref 10–45)
ALT FLD-CCNC: 17 U/L — SIGNIFICANT CHANGE UP (ref 10–45)
ANION GAP SERPL CALC-SCNC: 12 MMOL/L — SIGNIFICANT CHANGE UP (ref 5–17)
ANION GAP SERPL CALC-SCNC: 12 MMOL/L — SIGNIFICANT CHANGE UP (ref 5–17)
APTT BLD: 23.9 SEC — LOW (ref 24.5–35.6)
APTT BLD: 23.9 SEC — LOW (ref 24.5–35.6)
AST SERPL-CCNC: 18 U/L — SIGNIFICANT CHANGE UP (ref 10–40)
AST SERPL-CCNC: 18 U/L — SIGNIFICANT CHANGE UP (ref 10–40)
BASOPHILS # BLD AUTO: 0.01 K/UL — SIGNIFICANT CHANGE UP (ref 0–0.2)
BASOPHILS # BLD AUTO: 0.01 K/UL — SIGNIFICANT CHANGE UP (ref 0–0.2)
BASOPHILS NFR BLD AUTO: 0.1 % — SIGNIFICANT CHANGE UP (ref 0–2)
BASOPHILS NFR BLD AUTO: 0.1 % — SIGNIFICANT CHANGE UP (ref 0–2)
BILIRUB SERPL-MCNC: <0.1 MG/DL — LOW (ref 0.2–1.2)
BILIRUB SERPL-MCNC: <0.1 MG/DL — LOW (ref 0.2–1.2)
BUN SERPL-MCNC: 7 MG/DL — SIGNIFICANT CHANGE UP (ref 7–23)
BUN SERPL-MCNC: 7 MG/DL — SIGNIFICANT CHANGE UP (ref 7–23)
CALCIUM SERPL-MCNC: 7.3 MG/DL — LOW (ref 8.4–10.5)
CALCIUM SERPL-MCNC: 7.3 MG/DL — LOW (ref 8.4–10.5)
CHLORIDE SERPL-SCNC: 101 MMOL/L — SIGNIFICANT CHANGE UP (ref 96–108)
CHLORIDE SERPL-SCNC: 101 MMOL/L — SIGNIFICANT CHANGE UP (ref 96–108)
CO2 SERPL-SCNC: 22 MMOL/L — SIGNIFICANT CHANGE UP (ref 22–31)
CO2 SERPL-SCNC: 22 MMOL/L — SIGNIFICANT CHANGE UP (ref 22–31)
CREAT SERPL-MCNC: 0.49 MG/DL — LOW (ref 0.5–1.3)
CREAT SERPL-MCNC: 0.49 MG/DL — LOW (ref 0.5–1.3)
EGFR: 129 ML/MIN/1.73M2 — SIGNIFICANT CHANGE UP
EGFR: 129 ML/MIN/1.73M2 — SIGNIFICANT CHANGE UP
EOSINOPHIL # BLD AUTO: 0 K/UL — SIGNIFICANT CHANGE UP (ref 0–0.5)
EOSINOPHIL # BLD AUTO: 0 K/UL — SIGNIFICANT CHANGE UP (ref 0–0.5)
EOSINOPHIL NFR BLD AUTO: 0 % — SIGNIFICANT CHANGE UP (ref 0–6)
EOSINOPHIL NFR BLD AUTO: 0 % — SIGNIFICANT CHANGE UP (ref 0–6)
FIBRINOGEN PPP-MCNC: 524 MG/DL — HIGH (ref 200–445)
FIBRINOGEN PPP-MCNC: 524 MG/DL — HIGH (ref 200–445)
GLUCOSE SERPL-MCNC: 106 MG/DL — HIGH (ref 70–99)
GLUCOSE SERPL-MCNC: 106 MG/DL — HIGH (ref 70–99)
HCT VFR BLD CALC: 29.1 % — LOW (ref 34.5–45)
HCT VFR BLD CALC: 29.1 % — LOW (ref 34.5–45)
HGB BLD-MCNC: 9.9 G/DL — LOW (ref 11.5–15.5)
HGB BLD-MCNC: 9.9 G/DL — LOW (ref 11.5–15.5)
IMM GRANULOCYTES NFR BLD AUTO: 0.7 % — SIGNIFICANT CHANGE UP (ref 0–0.9)
IMM GRANULOCYTES NFR BLD AUTO: 0.7 % — SIGNIFICANT CHANGE UP (ref 0–0.9)
INR BLD: 0.87 RATIO — SIGNIFICANT CHANGE UP (ref 0.85–1.18)
INR BLD: 0.87 RATIO — SIGNIFICANT CHANGE UP (ref 0.85–1.18)
LDH SERPL L TO P-CCNC: 158 U/L — SIGNIFICANT CHANGE UP (ref 50–242)
LDH SERPL L TO P-CCNC: 158 U/L — SIGNIFICANT CHANGE UP (ref 50–242)
LYMPHOCYTES # BLD AUTO: 0.92 K/UL — LOW (ref 1–3.3)
LYMPHOCYTES # BLD AUTO: 0.92 K/UL — LOW (ref 1–3.3)
LYMPHOCYTES # BLD AUTO: 7.7 % — LOW (ref 13–44)
LYMPHOCYTES # BLD AUTO: 7.7 % — LOW (ref 13–44)
MAGNESIUM SERPL-MCNC: 5.1 MG/DL — HIGH (ref 1.6–2.6)
MAGNESIUM SERPL-MCNC: 5.1 MG/DL — HIGH (ref 1.6–2.6)
MAGNESIUM SERPL-MCNC: 5.7 MG/DL — HIGH (ref 1.6–2.6)
MCHC RBC-ENTMCNC: 28.4 PG — SIGNIFICANT CHANGE UP (ref 27–34)
MCHC RBC-ENTMCNC: 28.4 PG — SIGNIFICANT CHANGE UP (ref 27–34)
MCHC RBC-ENTMCNC: 34 GM/DL — SIGNIFICANT CHANGE UP (ref 32–36)
MCHC RBC-ENTMCNC: 34 GM/DL — SIGNIFICANT CHANGE UP (ref 32–36)
MCV RBC AUTO: 83.4 FL — SIGNIFICANT CHANGE UP (ref 80–100)
MCV RBC AUTO: 83.4 FL — SIGNIFICANT CHANGE UP (ref 80–100)
MONOCYTES # BLD AUTO: 0.68 K/UL — SIGNIFICANT CHANGE UP (ref 0–0.9)
MONOCYTES # BLD AUTO: 0.68 K/UL — SIGNIFICANT CHANGE UP (ref 0–0.9)
MONOCYTES NFR BLD AUTO: 5.7 % — SIGNIFICANT CHANGE UP (ref 2–14)
MONOCYTES NFR BLD AUTO: 5.7 % — SIGNIFICANT CHANGE UP (ref 2–14)
NEUTROPHILS # BLD AUTO: 10.24 K/UL — HIGH (ref 1.8–7.4)
NEUTROPHILS # BLD AUTO: 10.24 K/UL — HIGH (ref 1.8–7.4)
NEUTROPHILS NFR BLD AUTO: 85.8 % — HIGH (ref 43–77)
NEUTROPHILS NFR BLD AUTO: 85.8 % — HIGH (ref 43–77)
NRBC # BLD: 0 /100 WBCS — SIGNIFICANT CHANGE UP (ref 0–0)
NRBC # BLD: 0 /100 WBCS — SIGNIFICANT CHANGE UP (ref 0–0)
PLATELET # BLD AUTO: 227 K/UL — SIGNIFICANT CHANGE UP (ref 150–400)
PLATELET # BLD AUTO: 227 K/UL — SIGNIFICANT CHANGE UP (ref 150–400)
POTASSIUM SERPL-MCNC: 4.2 MMOL/L — SIGNIFICANT CHANGE UP (ref 3.5–5.3)
POTASSIUM SERPL-MCNC: 4.2 MMOL/L — SIGNIFICANT CHANGE UP (ref 3.5–5.3)
POTASSIUM SERPL-SCNC: 4.2 MMOL/L — SIGNIFICANT CHANGE UP (ref 3.5–5.3)
POTASSIUM SERPL-SCNC: 4.2 MMOL/L — SIGNIFICANT CHANGE UP (ref 3.5–5.3)
PROT SERPL-MCNC: 5.8 G/DL — LOW (ref 6–8.3)
PROT SERPL-MCNC: 5.8 G/DL — LOW (ref 6–8.3)
PROTHROM AB SERPL-ACNC: 9.6 SEC — SIGNIFICANT CHANGE UP (ref 9.5–13)
PROTHROM AB SERPL-ACNC: 9.6 SEC — SIGNIFICANT CHANGE UP (ref 9.5–13)
RBC # BLD: 3.49 M/UL — LOW (ref 3.8–5.2)
RBC # BLD: 3.49 M/UL — LOW (ref 3.8–5.2)
RBC # FLD: 14.1 % — SIGNIFICANT CHANGE UP (ref 10.3–14.5)
RBC # FLD: 14.1 % — SIGNIFICANT CHANGE UP (ref 10.3–14.5)
SODIUM SERPL-SCNC: 135 MMOL/L — SIGNIFICANT CHANGE UP (ref 135–145)
SODIUM SERPL-SCNC: 135 MMOL/L — SIGNIFICANT CHANGE UP (ref 135–145)
URATE SERPL-MCNC: 5.9 MG/DL — SIGNIFICANT CHANGE UP (ref 2.5–7)
URATE SERPL-MCNC: 5.9 MG/DL — SIGNIFICANT CHANGE UP (ref 2.5–7)
WBC # BLD: 11.93 K/UL — HIGH (ref 3.8–10.5)
WBC # BLD: 11.93 K/UL — HIGH (ref 3.8–10.5)
WBC # FLD AUTO: 11.93 K/UL — HIGH (ref 3.8–10.5)
WBC # FLD AUTO: 11.93 K/UL — HIGH (ref 3.8–10.5)

## 2023-11-02 RX ORDER — LABETALOL HCL 100 MG
200 TABLET ORAL
Refills: 0 | Status: DISCONTINUED | OUTPATIENT
Start: 2023-11-02 | End: 2023-11-05

## 2023-11-02 RX ADMIN — Medication 600 MILLIGRAM(S): at 15:27

## 2023-11-02 RX ADMIN — Medication 600 MILLIGRAM(S): at 16:15

## 2023-11-02 RX ADMIN — SERTRALINE 200 MILLIGRAM(S): 25 TABLET, FILM COATED ORAL at 06:17

## 2023-11-02 RX ADMIN — Medication 600 MILLIGRAM(S): at 10:30

## 2023-11-02 RX ADMIN — Medication 975 MILLIGRAM(S): at 11:54

## 2023-11-02 RX ADMIN — Medication 975 MILLIGRAM(S): at 18:52

## 2023-11-02 RX ADMIN — HEPARIN SODIUM 5000 UNIT(S): 5000 INJECTION INTRAVENOUS; SUBCUTANEOUS at 11:54

## 2023-11-02 RX ADMIN — Medication 600 MILLIGRAM(S): at 09:42

## 2023-11-02 RX ADMIN — Medication 975 MILLIGRAM(S): at 00:41

## 2023-11-02 RX ADMIN — Medication 60 MILLIGRAM(S): at 06:17

## 2023-11-02 RX ADMIN — Medication 975 MILLIGRAM(S): at 07:00

## 2023-11-02 RX ADMIN — SERTRALINE 200 MILLIGRAM(S): 25 TABLET, FILM COATED ORAL at 23:14

## 2023-11-02 RX ADMIN — Medication 200 MILLIGRAM(S): at 13:30

## 2023-11-02 RX ADMIN — Medication 975 MILLIGRAM(S): at 17:57

## 2023-11-02 RX ADMIN — Medication 975 MILLIGRAM(S): at 06:17

## 2023-11-02 RX ADMIN — Medication 1 TABLET(S): at 23:09

## 2023-11-02 RX ADMIN — HEPARIN SODIUM 5000 UNIT(S): 5000 INJECTION INTRAVENOUS; SUBCUTANEOUS at 23:09

## 2023-11-02 RX ADMIN — Medication 975 MILLIGRAM(S): at 23:09

## 2023-11-02 RX ADMIN — Medication 975 MILLIGRAM(S): at 12:30

## 2023-11-02 NOTE — PROVIDER CONTACT NOTE (CHANGE IN STATUS NOTIFICATION) - ACTION/TREATMENT ORDERED:
awaiting MD orders to administer meds and assess pt.
Dr. Leon called back with new order, starting Labetalol 200 mg BID.

## 2023-11-02 NOTE — PROGRESS NOTE ADULT - ASSESSMENT
A/P: 32yo POD#1 s/p LTCS at 33w3d for sPEC, now on magnesium for seizure prophylaxis.  Patient is stable and doing well post-operatively.      #sPEC  - BPs overnight 120-150/60-93  - c/w Mg for seizure ppx  - c/w Procardia 60XL  - HA resolved  - HELLP wnl, f/u AM HELLP    #Postpartum  - Continue regular diet.  - Increase ambulation.  - Continue motrin, tylenol, oxycodone PRN for pain control  - F/u AM CBC    CORINNA Yin  PGY-3

## 2023-11-02 NOTE — PROGRESS NOTE ADULT - ATTENDING COMMENTS
PT DOING WELL. SITTING UP IN BED.  NO C/O  VSS  INCISION C/D/I  H/H=9.9/29.1. PLT & LFTS NL  STABLE  ROUTINE PP CARE  MG TO BE D/C'D 3 PM  CONTINUE BUD FERREIRA

## 2023-11-02 NOTE — PROGRESS NOTE ADULT - SUBJECTIVE AND OBJECTIVE BOX
OB Progress Note:  Delivery, POD#1    S: Patient feels well overall, pain well controlled. Denies HA, blurry vision, RUQ pain, SOB.     O:   Vital Signs Last 24 Hrs  T(C): 36.4 (2023 00:00), Max: 37.6 (2023 08:51)  T(F): 97.6 (2023 00:00), Max: 99.68 (2023 08:51)  HR: 64 (2023 04:00) (64 - 118)  BP: 146/82 (2023 04:00) (121/63 - 161/93)  BP(mean): 86 (2023 20:00) (86 - 118)  RR: 18 (2023 04:) (18 - 20)  SpO2: 97% (2023 04:00) (93% - 98%)    Parameters below as of 2023 04:00  Patient On (Oxygen Delivery Method): room air        Labs:  Blood type: O Positive  Rubella IgG: RPR: Negative                          11.5   10.29 >-----------< 231    (  @ 09:14 )             34.2<L>                        11.9   9.94 >-----------< 208    (  @ 01:40 )             35.5                        11.6   8.41 >-----------< 214    ( 10-30 @ 06:11 )             34.2<L>    23 @ 09:14      129<L>  |  94<L>  |  5<L>  ----------------------------<  88  4.0   |  20<L>  |  0.51    23 @ 01:40      134<L>  |  100  |  6<L>  ----------------------------<  92  3.9   |  20<L>  |  0.48<L>    10-30-23 @ 06:11      137  |  102  |  6<L>  ----------------------------<  79  4.0   |  22  |  0.46<L>        Ca    8.5      2023 09:14  Ca    9.4      2023 01:40  Ca    9.4      30 Oct 2023 06:11  Mg     5.1<H>       Mg     5.0<H>         TPro  6.8  /  Alb  3.6  /  TBili  0.1<L>  /  DBili  x   /  AST  19  /  ALT  21  /  AlkPhos  165<H>  23 @ 09:14  TPro  6.9  /  Alb  3.6  /  TBili  0.2  /  DBili  x   /  AST  15  /  ALT  18  /  AlkPhos  158<H>  23 @ 01:40  TPro  6.7  /  Alb  3.4  /  TBili  0.2  /  DBili  x   /  AST  17  /  ALT  20  /  AlkPhos  148<H>  10-30-23 @ 06:11          acetaminophen     Tablet .. 975 milliGRAM(s) Oral every 6 hours  calcium carbonate    500 mG (Tums) Chewable 2 Tablet(s) Chew three times a day PRN  dexAMETHasone  Injectable 4 milliGRAM(s) IV Push every 6 hours PRN  heparin   Injectable 5000 Unit(s) SubCutaneous every 12 hours  ibuprofen  Tablet. 600 milliGRAM(s) Oral every 6 hours  lactated ringers. 1000 milliLiter(s) IV Continuous <Continuous>  magnesium sulfate Infusion 2 Gm/Hr IV Continuous <Continuous>  morphine PF Spinal 0.1 milliGRAM(s) IntraThecal. once  nalbuphine Injectable 2.5 milliGRAM(s) IV Push every 6 hours PRN  naloxone Injectable 0.1 milliGRAM(s) IV Push every 3 minutes PRN  NIFEdipine XL 60 milliGRAM(s) Oral daily  ondansetron Injectable 4 milliGRAM(s) IV Push every 6 hours PRN  oxyCODONE    IR 5 milliGRAM(s) Oral every 3 hours PRN  oxyCODONE    IR 10 milliGRAM(s) Oral every 3 hours PRN  oxytocin Infusion 333.333 milliUNIT(s)/Min IV Continuous <Continuous>  prenatal multivitamin 1 Tablet(s) Oral daily  sertraline 200 milliGRAM(s) Oral daily      PE:  General: NAD  Abdomen: Mildly distended, appropriately tender, incision c/d/i.  Extremities: No calf tenderness

## 2023-11-02 NOTE — PROGRESS NOTE ADULT - SUBJECTIVE AND OBJECTIVE BOX
Day 1 of Anesthesia Pain Management Service    SUBJECTIVE: Doing ok  Pain Scale Score:          [X] Refer to charted pain scores    THERAPY:    s/p    100 mcg PF morphine on 11\1\2023      MEDICATIONS  (STANDING):  acetaminophen     Tablet .. 975 milliGRAM(s) Oral every 6 hours  heparin   Injectable 5000 Unit(s) SubCutaneous every 12 hours  ibuprofen  Tablet. 600 milliGRAM(s) Oral every 6 hours  lactated ringers. 1000 milliLiter(s) (50 mL/Hr) IV Continuous <Continuous>  magnesium sulfate Infusion 2 Gm/Hr (50 mL/Hr) IV Continuous <Continuous>  morphine PF Spinal 0.1 milliGRAM(s) IntraThecal. once  NIFEdipine XL 60 milliGRAM(s) Oral daily  oxytocin Infusion 333.333 milliUNIT(s)/Min (1000 mL/Hr) IV Continuous <Continuous>  prenatal multivitamin 1 Tablet(s) Oral daily  sertraline 200 milliGRAM(s) Oral daily    MEDICATIONS  (PRN):  calcium carbonate    500 mG (Tums) Chewable 2 Tablet(s) Chew three times a day PRN Heartburn  dexAMETHasone  Injectable 4 milliGRAM(s) IV Push every 6 hours PRN Nausea  nalbuphine Injectable 2.5 milliGRAM(s) IV Push every 6 hours PRN Pruritus  naloxone Injectable 0.1 milliGRAM(s) IV Push every 3 minutes PRN For ANY of the following changes in patient status:  A. Breaths Per Minute LESS THAN 10, B. Oxygen saturation LESS THAN 90%, C. Sedation score of 6 for Stop After: 4 Times  ondansetron Injectable 4 milliGRAM(s) IV Push every 6 hours PRN Nausea  oxyCODONE    IR 5 milliGRAM(s) Oral every 3 hours PRN Mild Pain (1 - 3)  oxyCODONE    IR 10 milliGRAM(s) Oral every 3 hours PRN Moderate Pain (4 - 6)      OBJECTIVE:    Sedation:        	[X] Alert	 [ ] Drowsy	[ ] Arousable      [ ] Asleep       [ ] Unresponsive    Side Effects:	[X] None 	[ ] Nausea	[ ] Vomiting         [ ] Pruritus  		[ ] Weakness            [ ] Numbness	          [ ] Other:    Vital Signs Last 24 Hrs  T(C): 36.6 (02 Nov 2023 08:02), Max: 37.6 (01 Nov 2023 08:51)  T(F): 97.9 (02 Nov 2023 08:02), Max: 99.68 (01 Nov 2023 08:51)  HR: 86 (02 Nov 2023 08:02) (64 - 106)  BP: 135/86 (02 Nov 2023 08:02) (121/63 - 161/93)  BP(mean): 86 (01 Nov 2023 20:00) (86 - 118)  RR: 18 (02 Nov 2023 08:02) (18 - 20)  SpO2: 97% (02 Nov 2023 08:02) (93% - 98%)    Parameters below as of 02 Nov 2023 08:02  Patient On (Oxygen Delivery Method): room air        ASSESSMENT/ PLAN  [X] Patient to be transitioned to prn analgesics after 24 hours  [X] Pain management per primary service, pain service to sign off   [X]Documentation and Verification of current medications

## 2023-11-03 RX ORDER — ONDANSETRON 8 MG/1
4 TABLET, FILM COATED ORAL ONCE
Refills: 0 | Status: COMPLETED | OUTPATIENT
Start: 2023-11-03 | End: 2023-11-03

## 2023-11-03 RX ORDER — OXYCODONE HYDROCHLORIDE 5 MG/1
5 TABLET ORAL EVERY 4 HOURS
Refills: 0 | Status: DISCONTINUED | OUTPATIENT
Start: 2023-11-03 | End: 2023-11-05

## 2023-11-03 RX ADMIN — Medication 600 MILLIGRAM(S): at 15:30

## 2023-11-03 RX ADMIN — Medication 600 MILLIGRAM(S): at 10:30

## 2023-11-03 RX ADMIN — Medication 975 MILLIGRAM(S): at 12:40

## 2023-11-03 RX ADMIN — Medication 975 MILLIGRAM(S): at 06:15

## 2023-11-03 RX ADMIN — OXYCODONE HYDROCHLORIDE 5 MILLIGRAM(S): 5 TABLET ORAL at 23:40

## 2023-11-03 RX ADMIN — Medication 975 MILLIGRAM(S): at 13:10

## 2023-11-03 RX ADMIN — ONDANSETRON 4 MILLIGRAM(S): 8 TABLET, FILM COATED ORAL at 09:19

## 2023-11-03 RX ADMIN — Medication 60 MILLIGRAM(S): at 05:18

## 2023-11-03 RX ADMIN — Medication 975 MILLIGRAM(S): at 18:49

## 2023-11-03 RX ADMIN — Medication 200 MILLIGRAM(S): at 18:49

## 2023-11-03 RX ADMIN — Medication 200 MILLIGRAM(S): at 05:18

## 2023-11-03 RX ADMIN — SERTRALINE 200 MILLIGRAM(S): 25 TABLET, FILM COATED ORAL at 22:10

## 2023-11-03 RX ADMIN — HEPARIN SODIUM 5000 UNIT(S): 5000 INJECTION INTRAVENOUS; SUBCUTANEOUS at 12:40

## 2023-11-03 RX ADMIN — Medication 600 MILLIGRAM(S): at 15:00

## 2023-11-03 RX ADMIN — OXYCODONE HYDROCHLORIDE 5 MILLIGRAM(S): 5 TABLET ORAL at 03:35

## 2023-11-03 RX ADMIN — Medication 600 MILLIGRAM(S): at 22:10

## 2023-11-03 RX ADMIN — Medication 600 MILLIGRAM(S): at 04:35

## 2023-11-03 RX ADMIN — Medication 600 MILLIGRAM(S): at 10:04

## 2023-11-03 RX ADMIN — OXYCODONE HYDROCHLORIDE 5 MILLIGRAM(S): 5 TABLET ORAL at 04:35

## 2023-11-03 RX ADMIN — Medication 1 TABLET(S): at 22:10

## 2023-11-03 RX ADMIN — OXYCODONE HYDROCHLORIDE 5 MILLIGRAM(S): 5 TABLET ORAL at 22:47

## 2023-11-03 RX ADMIN — Medication 600 MILLIGRAM(S): at 22:50

## 2023-11-03 RX ADMIN — Medication 975 MILLIGRAM(S): at 19:20

## 2023-11-03 RX ADMIN — Medication 975 MILLIGRAM(S): at 00:09

## 2023-11-03 RX ADMIN — Medication 600 MILLIGRAM(S): at 03:35

## 2023-11-03 RX ADMIN — Medication 975 MILLIGRAM(S): at 05:18

## 2023-11-03 NOTE — PROGRESS NOTE ADULT - TIME BILLING
31 weeks, preeclampsia with severe features
30 weeks gestation, preeclampsia with severe features
30 weeks gestation, preeclampsia with severe features
postop care

## 2023-11-03 NOTE — PROGRESS NOTE ADULT - ASSESSMENT
A/P: 32yo POD#2 s/p LTCS at 33w3d for sPEC, now s/p magnesium for seizure prophylaxis.  Patient is stable and doing well post-operatively.      #sPEC  - BPs overnight 100-120/70-80  - s/pMg for seizure ppx  - c/w Procardia 60XL, labetalol 200 BID  - HA resolved  - HELLP wnl    #Postpartum  - Continue regular diet.  - Increase ambulation.  - Continue motrin, tylenol, oxycodone PRN for pain control    CORINNA Yin  PGY-3

## 2023-11-03 NOTE — PROGRESS NOTE ADULT - ATTENDING COMMENTS
POD2 s/p  section, doing well, feeling well not yet passing flatus  -continue to monitor BPs and titrate meds as needed.  -Routine postoperative care   -Reg diet   -OOB strongly encouraged     Naima Green,

## 2023-11-03 NOTE — PROGRESS NOTE ADULT - SUBJECTIVE AND OBJECTIVE BOX
OB Progress Note: LTCS, POD#2    S: 32yo POD#2 s/p LTCS. Pain is well controlled. She is tolerating a regular diet and passing flatus. She is voiding spontaneously, and ambulating without difficulty. Denies CP/SOB. Denies lightheadedness/dizziness. Denies N/V.    O:  Vitals:  Vital Signs Last 24 Hrs  T(C): 36.7 (03 Nov 2023 00:30), Max: 36.8 (02 Nov 2023 18:00)  T(F): 98 (03 Nov 2023 00:30), Max: 98.2 (02 Nov 2023 18:00)  HR: 84 (03 Nov 2023 00:30) (74 - 86)  BP: 122/87 (03 Nov 2023 00:30) (109/71 - 152/95)  BP(mean): --  RR: 18 (03 Nov 2023 00:30) (18 - 18)  SpO2: 98% (03 Nov 2023 00:30) (96% - 98%)    Parameters below as of 03 Nov 2023 00:30  Patient On (Oxygen Delivery Method): room air        MEDICATIONS  (STANDING):  acetaminophen     Tablet .. 975 milliGRAM(s) Oral every 6 hours  heparin   Injectable 5000 Unit(s) SubCutaneous every 12 hours  ibuprofen  Tablet. 600 milliGRAM(s) Oral every 6 hours  labetalol 200 milliGRAM(s) Oral two times a day  lactated ringers. 1000 milliLiter(s) (50 mL/Hr) IV Continuous <Continuous>  NIFEdipine XL 60 milliGRAM(s) Oral daily  oxytocin Infusion 333.333 milliUNIT(s)/Min (1000 mL/Hr) IV Continuous <Continuous>  prenatal multivitamin 1 Tablet(s) Oral daily  sertraline 200 milliGRAM(s) Oral daily      MEDICATIONS  (PRN):  calcium carbonate    500 mG (Tums) Chewable 2 Tablet(s) Chew three times a day PRN Heartburn  oxyCODONE    IR 5 milliGRAM(s) Oral every 4 hours PRN Severe Pain (7 - 10)      Labs:  Blood type: O Positive  Rubella IgG: RPR: Negative                          9.9<L>   11.93<H> >-----------< 227    ( 11-02 @ 06:16 )             29.1<L>                        11.5   10.29 >-----------< 231    ( 11-01 @ 09:14 )             34.2<L>                        11.9   9.94 >-----------< 208    ( 11-01 @ 01:40 )             35.5    11-02-23 @ 06:15      135  |  101  |  7   ----------------------------<  106<H>  4.2   |  22  |  0.49<L>    11-01-23 @ 09:14      129<L>  |  94<L>  |  5<L>  ----------------------------<  88  4.0   |  20<L>  |  0.51    11-01-23 @ 01:40      134<L>  |  100  |  6<L>  ----------------------------<  92  3.9   |  20<L>  |  0.48<L>        Ca    7.3<L>      02 Nov 2023 06:15  Ca    8.5      01 Nov 2023 09:14  Ca    9.4      01 Nov 2023 01:40  Mg     5.7<H>     11-02  Mg     5.7<H>     11-02  Mg     5.1<H>     11-02  Mg     5.0<H>     11-01    TPro  5.8<L>  /  Alb  3.2<L>  /  TBili  <0.1<L>  /  DBili  x   /  AST  18  /  ALT  17  /  AlkPhos  131<H>  11-02-23 @ 06:15  TPro  6.8  /  Alb  3.6  /  TBili  0.1<L>  /  DBili  x   /  AST  19  /  ALT  21  /  AlkPhos  165<H>  11-01-23 @ 09:14  TPro  6.9  /  Alb  3.6  /  TBili  0.2  /  DBili  x   /  AST  15  /  ALT  18  /  AlkPhos  158<H>  11-01-23 @ 01:40          PE:  General: NAD  Abdomen: Soft, appropriately tender, incision c/d/i.  Extremities: No erythema, no pitting edema

## 2023-11-04 RX ORDER — ACETAMINOPHEN 500 MG
3 TABLET ORAL
Qty: 0 | Refills: 0 | DISCHARGE
Start: 2023-11-04

## 2023-11-04 RX ORDER — LABETALOL HCL 100 MG
1 TABLET ORAL
Qty: 0 | Refills: 0 | DISCHARGE
Start: 2023-11-04

## 2023-11-04 RX ORDER — IBUPROFEN 200 MG
1 TABLET ORAL
Qty: 0 | Refills: 0 | DISCHARGE
Start: 2023-11-04

## 2023-11-04 RX ORDER — SERTRALINE 25 MG/1
2 TABLET, FILM COATED ORAL
Qty: 0 | Refills: 0 | DISCHARGE
Start: 2023-11-04

## 2023-11-04 RX ORDER — NIFEDIPINE 30 MG
1 TABLET, EXTENDED RELEASE 24 HR ORAL
Qty: 0 | Refills: 0 | DISCHARGE
Start: 2023-11-04

## 2023-11-04 RX ADMIN — Medication 600 MILLIGRAM(S): at 21:40

## 2023-11-04 RX ADMIN — Medication 200 MILLIGRAM(S): at 18:14

## 2023-11-04 RX ADMIN — Medication 1 TABLET(S): at 20:39

## 2023-11-04 RX ADMIN — Medication 975 MILLIGRAM(S): at 13:00

## 2023-11-04 RX ADMIN — HEPARIN SODIUM 5000 UNIT(S): 5000 INJECTION INTRAVENOUS; SUBCUTANEOUS at 00:09

## 2023-11-04 RX ADMIN — Medication 975 MILLIGRAM(S): at 23:36

## 2023-11-04 RX ADMIN — OXYCODONE HYDROCHLORIDE 5 MILLIGRAM(S): 5 TABLET ORAL at 20:38

## 2023-11-04 RX ADMIN — HEPARIN SODIUM 5000 UNIT(S): 5000 INJECTION INTRAVENOUS; SUBCUTANEOUS at 23:37

## 2023-11-04 RX ADMIN — Medication 600 MILLIGRAM(S): at 09:19

## 2023-11-04 RX ADMIN — Medication 600 MILLIGRAM(S): at 04:00

## 2023-11-04 RX ADMIN — Medication 975 MILLIGRAM(S): at 19:09

## 2023-11-04 RX ADMIN — Medication 600 MILLIGRAM(S): at 03:09

## 2023-11-04 RX ADMIN — Medication 600 MILLIGRAM(S): at 10:05

## 2023-11-04 RX ADMIN — Medication 975 MILLIGRAM(S): at 18:14

## 2023-11-04 RX ADMIN — HEPARIN SODIUM 5000 UNIT(S): 5000 INJECTION INTRAVENOUS; SUBCUTANEOUS at 12:02

## 2023-11-04 RX ADMIN — Medication 975 MILLIGRAM(S): at 06:32

## 2023-11-04 RX ADMIN — Medication 60 MILLIGRAM(S): at 06:32

## 2023-11-04 RX ADMIN — SERTRALINE 200 MILLIGRAM(S): 25 TABLET, FILM COATED ORAL at 23:37

## 2023-11-04 RX ADMIN — Medication 600 MILLIGRAM(S): at 20:38

## 2023-11-04 RX ADMIN — Medication 600 MILLIGRAM(S): at 15:05

## 2023-11-04 RX ADMIN — Medication 200 MILLIGRAM(S): at 06:32

## 2023-11-04 RX ADMIN — Medication 975 MILLIGRAM(S): at 00:08

## 2023-11-04 RX ADMIN — Medication 975 MILLIGRAM(S): at 01:00

## 2023-11-04 RX ADMIN — Medication 975 MILLIGRAM(S): at 12:01

## 2023-11-04 RX ADMIN — Medication 600 MILLIGRAM(S): at 16:00

## 2023-11-04 RX ADMIN — OXYCODONE HYDROCHLORIDE 5 MILLIGRAM(S): 5 TABLET ORAL at 21:40

## 2023-11-04 NOTE — PROGRESS NOTE ADULT - ASSESSMENT
A/P: 34yo POD#3 s/p LTCS at 33w3d for sPEC, now s/p magnesium for seizure prophylaxis.  Patient is stable and doing well post-operatively.      #sPEC  - BPs overnight 110-120/60-80s  - s/p Mg for seizure ppx  - c/w Procardia 60XL, labetalol 200 BID  - HA resolved  - HELLP wnl    #Postpartum  - Continue regular diet.  - Increase ambulation.  - Continue motrin, tylenol, oxycodone PRN for pain control    RPolol Yin  PGY-3

## 2023-11-04 NOTE — PROGRESS NOTE ADULT - ATTENDING COMMENTS
Pt seen on am rounds and note reviewed  She feels well  VSS AF  BPs in good control  Fundus U-2  inc: C/D/I  Ext: trace edema, no cords    A/P: 34yo POD#3 s/p LTCS at 33w3d for sPEC, now s/p magnesium for seizure prophylaxis.  Patient is stable and doing well post-operatively.      #sPEC  - BPs overnight 110-120/60-80s  - s/p Mg for seizure ppx  - c/w Procardia 60XL, labetalol 200 BID  - HA resolved  - HELLP wnl    #Postpartum  - Continue regular diet.  - Increase ambulation.  - Continue motrin, tylenol, oxycodone PRN for pain control  Agree with above A&P  Jono Beckett MD

## 2023-11-04 NOTE — PROGRESS NOTE ADULT - SUBJECTIVE AND OBJECTIVE BOX
OB Postpartum Note:  Delivery, POD#3    S: 32yo POD#3 s/p LTCS. The patient feels well.  Pain is well controlled. She is tolerating a regular diet and passing flatus. She is voiding spontaneously, and ambulating without difficulty. Denies CP/SOB. Denies lightheadedness/dizziness. Denies N/V.    O:  Vitals:  Vital Signs Last 24 Hrs  T(C): 36.6 (2023 00:35), Max: 36.9 (2023 05:18)  T(F): 97.9 (2023 00:35), Max: 98.5 (2023 05:18)  HR: 78 (2023 00:35) (74 - 86)  BP: 121/66 (2023 00:35) (103/69 - 123/77)  BP(mean): --  RR: 18 (2023 00:35) (18 - 18)  SpO2: 95% (2023 00:35) (95% - 98%)    Parameters below as of 2023 00:35  Patient On (Oxygen Delivery Method): room air        MEDICATIONS  (STANDING):  acetaminophen     Tablet .. 975 milliGRAM(s) Oral every 6 hours  heparin   Injectable 5000 Unit(s) SubCutaneous every 12 hours  ibuprofen  Tablet. 600 milliGRAM(s) Oral every 6 hours  labetalol 200 milliGRAM(s) Oral two times a day  lactated ringers. 1000 milliLiter(s) (50 mL/Hr) IV Continuous <Continuous>  NIFEdipine XL 60 milliGRAM(s) Oral daily  oxytocin Infusion 333.333 milliUNIT(s)/Min (1000 mL/Hr) IV Continuous <Continuous>  prenatal multivitamin 1 Tablet(s) Oral daily  sertraline 200 milliGRAM(s) Oral daily    MEDICATIONS  (PRN):  calcium carbonate    500 mG (Tums) Chewable 2 Tablet(s) Chew three times a day PRN Heartburn  oxyCODONE    IR 5 milliGRAM(s) Oral every 4 hours PRN Severe Pain (7 - 10)      LABS:  Blood type: O Positive  Rubella IgG: RPR: Negative                          9.9<L>   11.93<H> >-----------< 227    (  @ 06:16 )             29.1<L>                        11.5   10.29 >-----------< 231    (  @ 09:14 )             34.2<L>    23 @ 06:15      135  |  101  |  7   ----------------------------<  106<H>  4.2   |  22  |  0.49<L>    23 @ 09:14      129<L>  |  94<L>  |  5<L>  ----------------------------<  88  4.0   |  20<L>  |  0.51        Ca    7.3<L>      2023 06:15  Ca    8.5      2023 09:14  Mg     5.7<H>       Mg     5.7<H>       Mg     5.1<H>       Mg     5.0<H>         TPro  5.8<L>  /  Alb  3.2<L>  /  TBili  <0.1<L>  /  DBili  x   /  AST  18  /  ALT  17  /  AlkPhos  131<H>  23 @ 06:15  TPro  6.8  /  Alb  3.6  /  TBili  0.1<L>  /  DBili  x   /  AST  19  /  ALT  21  /  AlkPhos  165<H>  23 @ 09:14          Physical exam:  Gen: NAD  Abdomen: Soft, nontender, no distension , firm uterine fundus at umbilicus.  Incision: Clean, dry, and intact   Pelvic: Normal lochia noted  Ext: No calf tenderness

## 2023-11-05 VITALS
TEMPERATURE: 98 F | OXYGEN SATURATION: 97 % | SYSTOLIC BLOOD PRESSURE: 118 MMHG | HEART RATE: 74 BPM | DIASTOLIC BLOOD PRESSURE: 78 MMHG | RESPIRATION RATE: 18 BRPM

## 2023-11-05 PROCEDURE — 87653 STREP B DNA AMP PROBE: CPT

## 2023-11-05 PROCEDURE — 76818 FETAL BIOPHYS PROFILE W/NST: CPT

## 2023-11-05 PROCEDURE — 86900 BLOOD TYPING SEROLOGIC ABO: CPT

## 2023-11-05 PROCEDURE — 36415 COLL VENOUS BLD VENIPUNCTURE: CPT

## 2023-11-05 PROCEDURE — 59050 FETAL MONITOR W/REPORT: CPT

## 2023-11-05 PROCEDURE — 80053 COMPREHEN METABOLIC PANEL: CPT

## 2023-11-05 PROCEDURE — 85730 THROMBOPLASTIN TIME PARTIAL: CPT

## 2023-11-05 PROCEDURE — 93005 ELECTROCARDIOGRAM TRACING: CPT

## 2023-11-05 PROCEDURE — 84156 ASSAY OF PROTEIN URINE: CPT

## 2023-11-05 PROCEDURE — 85025 COMPLETE CBC W/AUTO DIFF WBC: CPT

## 2023-11-05 PROCEDURE — 85384 FIBRINOGEN ACTIVITY: CPT

## 2023-11-05 PROCEDURE — 88307 TISSUE EXAM BY PATHOLOGIST: CPT

## 2023-11-05 PROCEDURE — 81001 URINALYSIS AUTO W/SCOPE: CPT

## 2023-11-05 PROCEDURE — 86901 BLOOD TYPING SEROLOGIC RH(D): CPT

## 2023-11-05 PROCEDURE — 86780 TREPONEMA PALLIDUM: CPT

## 2023-11-05 PROCEDURE — 85610 PROTHROMBIN TIME: CPT

## 2023-11-05 PROCEDURE — 59025 FETAL NON-STRESS TEST: CPT

## 2023-11-05 PROCEDURE — 82570 ASSAY OF URINE CREATININE: CPT

## 2023-11-05 PROCEDURE — C1765: CPT

## 2023-11-05 PROCEDURE — 76819 FETAL BIOPHYS PROFIL W/O NST: CPT

## 2023-11-05 PROCEDURE — 81003 URINALYSIS AUTO W/O SCOPE: CPT

## 2023-11-05 PROCEDURE — 93306 TTE W/DOPPLER COMPLETE: CPT

## 2023-11-05 PROCEDURE — 83735 ASSAY OF MAGNESIUM: CPT

## 2023-11-05 PROCEDURE — 84550 ASSAY OF BLOOD/URIC ACID: CPT

## 2023-11-05 PROCEDURE — 83615 LACTATE (LD) (LDH) ENZYME: CPT

## 2023-11-05 PROCEDURE — 86850 RBC ANTIBODY SCREEN: CPT

## 2023-11-05 RX ADMIN — Medication 975 MILLIGRAM(S): at 00:40

## 2023-11-05 RX ADMIN — Medication 600 MILLIGRAM(S): at 11:15

## 2023-11-05 RX ADMIN — Medication 60 MILLIGRAM(S): at 05:55

## 2023-11-05 RX ADMIN — Medication 600 MILLIGRAM(S): at 15:17

## 2023-11-05 RX ADMIN — Medication 600 MILLIGRAM(S): at 14:40

## 2023-11-05 RX ADMIN — Medication 600 MILLIGRAM(S): at 10:04

## 2023-11-05 RX ADMIN — Medication 975 MILLIGRAM(S): at 05:55

## 2023-11-05 RX ADMIN — Medication 600 MILLIGRAM(S): at 03:15

## 2023-11-05 RX ADMIN — Medication 975 MILLIGRAM(S): at 06:56

## 2023-11-05 RX ADMIN — Medication 200 MILLIGRAM(S): at 05:55

## 2023-11-05 RX ADMIN — Medication 600 MILLIGRAM(S): at 02:21

## 2023-11-05 NOTE — PROGRESS NOTE ADULT - ATTENDING COMMENTS
Pt feels weel  VSS AF  Fundus firm U-s  inc: C/D/I  Ext: no edema, no cords  A/P: 34yo POD#4 s/p LTCS at 33w3d for sPEC, now s/p magnesium for seizure prophylaxis.  Patient is stable and doing well post-operatively.      #sPEC  - BPs overnight 110s-130s/60s-80s  - s/p Mg for seizure ppx  - c/w Procardia 60XL, Labetalol 200 BID  - HA resolved  - HELLP wnl    D/C home  Jono Beckett MD

## 2023-11-05 NOTE — PROGRESS NOTE ADULT - ASSESSMENT
34yo POD#4 s/p LTCS at 33w3d for sPEC, now s/p magnesium for seizure prophylaxis.  Patient is stable and doing well post-operatively.      #sPEC  - BPs overnight 110s-130s/60s-80s  - s/p Mg for seizure ppx  - c/w Procardia 60XL, Labetalol 200 BID  - HA resolved  - HELLP wnl    #Postpartum  - Continue regular diet.  - Increase ambulation.  - Continue motrin, tylenol, oxycodone PRN for pain control    Helen Leon  PGY3

## 2023-11-05 NOTE — PROGRESS NOTE ADULT - ATTENDING SUPERVISION STATEMENT
Resident
Fellow
Resident

## 2023-11-05 NOTE — PROGRESS NOTE ADULT - SUBJECTIVE AND OBJECTIVE BOX
Patient seen and examined at bedside, no acute overnight events. No acute complaints, pain well controlled. Patient is ambulating and tolerating regular diet. Passing flatus and voiding spontaneously. Denies CP, SOB, N/V, HA, blurred vision, epigastric pain.    Vital Signs Last 24 Hours  T(C): 36.8 (11-04-23 @ 23:59), Max: 36.8 (11-04-23 @ 18:16)  HR: 78 (11-04-23 @ 23:59) (63 - 91)  BP: 110/69 (11-04-23 @ 23:59) (93/51 - 133/81)  RR: 18 (11-04-23 @ 23:59) (18 - 18)  SpO2: 98% (11-04-23 @ 23:59) (97% - 98%)    Physical Exam:  General: NAD  Abdomen: Soft, non-tender, non-distended, fundus firm  Incision: Pfannenstiel incision CDI, subcuticular suture closure  Pelvic: Lochia wnl, external exam of perineum clean and dry without swelling    Labs:    Blood Type: O Positive  Antibody Screen: Negative  RPR: Negative               9.9    11.93 )-----------( 227      ( 11-02 @ 06:16 )             29.1                11.5   10.29 )-----------( 231      ( 11-01 @ 09:14 )             34.2                11.9   9.94  )-----------( 208      ( 11-01 @ 01:40 )             35.5         MEDICATIONS  (STANDING):  acetaminophen     Tablet .. 975 milliGRAM(s) Oral every 6 hours  heparin   Injectable 5000 Unit(s) SubCutaneous every 12 hours  ibuprofen  Tablet. 600 milliGRAM(s) Oral every 6 hours  labetalol 200 milliGRAM(s) Oral two times a day  lactated ringers. 1000 milliLiter(s) (50 mL/Hr) IV Continuous <Continuous>  NIFEdipine XL 60 milliGRAM(s) Oral daily  oxytocin Infusion 333.333 milliUNIT(s)/Min (1000 mL/Hr) IV Continuous <Continuous>  prenatal multivitamin 1 Tablet(s) Oral daily  sertraline 200 milliGRAM(s) Oral daily    MEDICATIONS  (PRN):  calcium carbonate    500 mG (Tums) Chewable 2 Tablet(s) Chew three times a day PRN Heartburn  oxyCODONE    IR 5 milliGRAM(s) Oral every 4 hours PRN Severe Pain (7 - 10)

## 2023-11-05 NOTE — PROGRESS NOTE ADULT - PROVIDER SPECIALTY LIST ADULT
MFGEORGE
MFGEORGE
OB
Pain Medicine
Anesthesia
OB
MFGEORGE

## 2023-11-06 LAB
SURGICAL PATHOLOGY STUDY: SIGNIFICANT CHANGE UP
SURGICAL PATHOLOGY STUDY: SIGNIFICANT CHANGE UP

## 2023-12-01 ENCOUNTER — TRANSCRIPTION ENCOUNTER (OUTPATIENT)
Age: 33
End: 2023-12-01

## 2023-12-06 ENCOUNTER — APPOINTMENT (OUTPATIENT)
Dept: CARDIOLOGY | Facility: CLINIC | Age: 33
End: 2023-12-06
Payer: COMMERCIAL

## 2023-12-06 DIAGNOSIS — Z82.49 FAMILY HISTORY OF ISCHEMIC HEART DISEASE AND OTHER DISEASES OF THE CIRCULATORY SYSTEM: ICD-10-CM

## 2023-12-06 PROCEDURE — 99203 OFFICE O/P NEW LOW 30 MIN: CPT | Mod: 95

## 2023-12-15 PROBLEM — Z82.49 FAMILY HISTORY OF ESSENTIAL HYPERTENSION: Status: ACTIVE | Noted: 2023-12-15

## 2023-12-15 NOTE — HISTORY OF PRESENT ILLNESS
[FreeTextEntry1] : Ms. Carrillo is a 33 year old female that presents [facility] for blood pressure managment postpartum.  She carries a history of a recent delivery of a baby boy on 2023 by  section at 33 weeks and 3 days. Baby was emergently delivered due to the presentation of preeclampsia with severe hypertension.  Patient was treated with IV Magnesium X 24 hours for seizure prophylaxis and discharged to home when blood pressures were controlled on Procardia and Labetalol.  Baby was treated in the NICU for 11 days.   +Family history of maternal and paternal hypertension and maternal esophageal cancer  Patient reports that her blood pressures are now normotensive.  BP today 130/80 off all medication.  She denies any issues with shortness of breath, chest discomfort or palpitations.

## 2023-12-15 NOTE — ASSESSMENT
[FreeTextEntry1] : Ms. Carrillo is a 33 year old female that presents to [facility] for blood pressure managment postpartum.  She carries a history of a recent delivery of a baby boy on 2023 by  section at 33 weeks and 3 days. Baby was emergently delivered due to the presentation of preeclampsia with severe hypertension.  Patient was treated with IV Magnesium X 24 hours for seizure prophylaxis and discharged to home when blood pressures were controlled on Procardia and Labetalol.  Baby was treated in the NICU for 11 days.   +Family history of maternal and paternal hypertension and maternal esophageal cancer  #History of PEC/ gHTN   Blood pressures reported to have returned to a normotensive state   Recommended that patient to continue monitoring her blood pressures daily for another week and   report if systolic pressures rise above 140.  #Adverse Cardiovascular Risk Factors  Personal history of gestational hypertension, preeclampsia Family history of hypertension  Recommending a baseline cardiovascular evaluation 3 months postpartum to assess risk In-office physical examination and 12 lead EKG Echocardiogram to assess cardiac structure and function Exercise stress testing to assess functional status Full blood work panel:  CBC,CMP, Hgb A1C, TSH, Lipid profile  - Encouraged patient to participate in  healthy exercise (when cleared by OB) and eating habits, focusing on a Mediterranean style of eating and aiming for the recommended 150 minutes per week of moderate physical activity.  - Encouraged the patient to find healthy outlets and coping mechanisms to help manage stress, such as physical activity/exercise, reducing workload if possible, spending time with family and friends, engaging in an enjoyable hobby, or using meditation or mindfulness techniques.

## 2024-01-04 ENCOUNTER — NON-APPOINTMENT (OUTPATIENT)
Age: 34
End: 2024-01-04

## 2024-01-22 ENCOUNTER — TRANSCRIPTION ENCOUNTER (OUTPATIENT)
Age: 34
End: 2024-01-22

## 2024-02-02 ENCOUNTER — TRANSCRIPTION ENCOUNTER (OUTPATIENT)
Age: 34
End: 2024-02-02

## 2024-03-22 ENCOUNTER — APPOINTMENT (OUTPATIENT)
Dept: CARDIOLOGY | Facility: CLINIC | Age: 34
End: 2024-03-22
Payer: COMMERCIAL

## 2024-03-22 ENCOUNTER — APPOINTMENT (OUTPATIENT)
Dept: CV DIAGNOSITCS | Facility: HOSPITAL | Age: 34
End: 2024-03-22

## 2024-03-22 VITALS
WEIGHT: 182 LBS | OXYGEN SATURATION: 98 % | HEART RATE: 73 BPM | DIASTOLIC BLOOD PRESSURE: 81 MMHG | HEIGHT: 66 IN | BODY MASS INDEX: 29.25 KG/M2 | SYSTOLIC BLOOD PRESSURE: 125 MMHG

## 2024-03-22 DIAGNOSIS — R06.09 OTHER FORMS OF DYSPNEA: ICD-10-CM

## 2024-03-22 DIAGNOSIS — Z86.79 PERSONAL HISTORY OF OTHER DISEASES OF THE CIRCULATORY SYSTEM: ICD-10-CM

## 2024-03-22 PROCEDURE — 99214 OFFICE O/P EST MOD 30 MIN: CPT | Mod: 25

## 2024-03-22 PROCEDURE — 93000 ELECTROCARDIOGRAM COMPLETE: CPT

## 2024-03-22 RX ORDER — LABETALOL HYDROCHLORIDE 100 MG/1
100 TABLET, FILM COATED ORAL
Qty: 120 | Refills: 1 | Status: ACTIVE | COMMUNITY
Start: 2024-03-22 | End: 1900-01-01

## 2024-03-22 RX ORDER — BUPROPION HYDROCHLORIDE 150 MG/1
150 TABLET, FILM COATED ORAL
Refills: 0 | Status: ACTIVE | COMMUNITY
Start: 2024-03-22

## 2024-03-22 RX ORDER — METFORMIN HYDROCHLORIDE 500 MG/1
500 TABLET, COATED ORAL
Refills: 0 | Status: ACTIVE | COMMUNITY
Start: 2024-03-22

## 2024-03-22 RX ORDER — TOPIRAMATE 15 MG/1
15 CAPSULE, COATED PELLETS ORAL
Refills: 0 | Status: ACTIVE | COMMUNITY
Start: 2024-03-22

## 2024-03-22 RX ORDER — NALTREXONE HCL 1.5 MG
1.5 CAPSULE ORAL
Refills: 0 | Status: ACTIVE | COMMUNITY
Start: 2024-03-22

## 2024-03-22 NOTE — DISCUSSION/SUMMARY
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 Currently on Labetalol 200 mg taking at pm only   # sPEC : BP Stable Labetalol 100 mg bid ( hold if BP < 120/80)  Encouraged Patient to monitor BP at home, keep a log, and report results back to us for evaluation. Based on the results, we will adjust medications as necessary. Additionally, encouraged a heart-healthy diet and exercise as tolerated. EKG with no acute changes.  # Exercise stress test to evaluate for functional status. Routine labs ordered - CBC, CMP, LIPIDS, TSH, Vit D, A1C Encouraged patient to continue healthy exercise and eating habits, focusing on a Mediterranean style of eating and aiming for the recommended 150 minutes per week of moderate physical activity.  # Patient on weight loss program ( 5 month )  [EKG obtained to assist in diagnosis and management of assessed problem(s)] : EKG obtained to assist in diagnosis and management of assessed problem(s)

## 2024-03-22 NOTE — CARDIOLOGY SUMMARY
[de-identified] : sR 60s  [de-identified] : CONCLUSIONS:   1. Normal echocardiogram.  ________________________________________________________________________________________ FINDINGS:   Left Ventricle: The left ventricular cavity is normal. Left ventricular wall thickness is normal. Left ventricular systolic function is normal with a calculated ejection fraction of 69 % by the Perez's biplane method of disks. There is normal left ventricular diastolic function. No left ventricular hypertrophy. There is normal LV mass and concentric remodeling.   Right Ventricle: The right ventricular cavity is normal in size, normal wall thickness and normal systolic function. Tricuspid annular plane systolic excursion (TAPSE) is 3.1 cm (normal >=1.7 cm). Tricuspid annular tissue Doppler S' is 19.7 cm/s (normal >10 cm/s).   Left Atrium: The left atrium is normal in size with an indexed volume of 24.17 ml/m?.   Right Atrium: The right atrium is normal in size with an indexed volume of 11.91 ml/m?.   Interatrial Septum: The interatrial septum appears intact.   Aortic Valve: Normal aortic valve. There is no aortic valve stenosis.   Mitral Valve: Normal mitral valve.   Tricuspid Valve: Normal tricuspid valve.   Pulmonic Valve: Normal pulmonic valve.   Aorta: The aortic annulus and aortic root appear normal in size.   Pericardium: There is a trace pericardial effusion.   Systemic Veins: The inferior vena cava is normal in size measuring 1.50 cm in diameter, (normal <2.1cm) with normal inspiratory collapse (normal >50%) consistent with normal right atrial pressure (~3, range 0-5mmHg). ____________________________________________________________________

## 2024-03-22 NOTE — HISTORY OF PRESENT ILLNESS
[FreeTextEntry1] : Patient presents in for follow up.   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 Currently on Labetalol 200 mg taking at pm only. Denies chest pain, shortness of breath, dizziness, lightheadedness, palpitations or near syncope or syncope, orthopnea, PND and increasing lower extremity edema.   # sPEC : BP Stable Labetalol 100 mg bid ( hold if BP < 120/80)  Encouraged Patient to monitor BP at home, keep a log, and report results back to us for evaluation. Based on the results, we will adjust medications as necessary. Additionally, encouraged a heart-healthy diet and exercise as tolerated. EKG with no acute changes.  # Exercise stress test to evaluate for functional status. Routine labs ordered - CBC, CMP, LIPIDS, TSH, Vit D, A1C Encouraged patient to continue healthy exercise and eating habits, focusing on a Mediterranean style of eating and aiming for the recommended 150 minutes per week of moderate physical activity.  # Patient on weight loss program ( 5 month )

## 2024-04-22 ENCOUNTER — RESULT REVIEW (OUTPATIENT)
Age: 34
End: 2024-04-22

## 2024-04-23 ENCOUNTER — APPOINTMENT (OUTPATIENT)
Dept: CV DIAGNOSTICS | Facility: HOSPITAL | Age: 34
End: 2024-04-23

## 2024-04-23 ENCOUNTER — OUTPATIENT (OUTPATIENT)
Dept: OUTPATIENT SERVICES | Facility: HOSPITAL | Age: 34
LOS: 1 days | End: 2024-04-23
Payer: COMMERCIAL

## 2024-04-23 DIAGNOSIS — R06.09 OTHER FORMS OF DYSPNEA: ICD-10-CM

## 2024-04-23 PROCEDURE — 93017 CV STRESS TEST TRACING ONLY: CPT

## 2024-04-23 PROCEDURE — 93018 CV STRESS TEST I&R ONLY: CPT

## 2024-04-23 PROCEDURE — 93016 CV STRESS TEST SUPVJ ONLY: CPT

## 2024-06-27 ENCOUNTER — APPOINTMENT (OUTPATIENT)
Dept: CARDIOLOGY | Facility: CLINIC | Age: 34
End: 2024-06-27
Payer: COMMERCIAL

## 2024-06-27 VITALS
SYSTOLIC BLOOD PRESSURE: 117 MMHG | BODY MASS INDEX: 27.64 KG/M2 | HEIGHT: 66 IN | WEIGHT: 172 LBS | DIASTOLIC BLOOD PRESSURE: 81 MMHG | OXYGEN SATURATION: 99 % | HEART RATE: 80 BPM

## 2024-06-27 PROCEDURE — 99204 OFFICE O/P NEW MOD 45 MIN: CPT | Mod: 25

## 2024-06-27 PROCEDURE — 93000 ELECTROCARDIOGRAM COMPLETE: CPT

## 2024-06-27 PROCEDURE — 99214 OFFICE O/P EST MOD 30 MIN: CPT | Mod: 25

## 2024-06-28 ENCOUNTER — APPOINTMENT (OUTPATIENT)
Dept: CARDIOLOGY | Facility: CLINIC | Age: 34
End: 2024-06-28

## 2024-11-18 ENCOUNTER — ASOB RESULT (OUTPATIENT)
Age: 34
End: 2024-11-18

## 2024-11-18 ENCOUNTER — APPOINTMENT (OUTPATIENT)
Dept: ANTEPARTUM | Facility: CLINIC | Age: 34
End: 2024-11-18
Payer: COMMERCIAL

## 2024-11-18 PROCEDURE — 76811 OB US DETAILED SNGL FETUS: CPT

## 2024-12-13 NOTE — OB RN PATIENT PROFILE - NS PRO TDAP RECEIVED Y/N
Patient has been compliant with metformin.  A1c is improved.  Recheck in three months.   No issues with feet currently.  Pneumonia vaccine is due.    Monitor hemoglobin A1c.  Discussed diabetic diet and exercise protocol.  Continue medications.  Monitor for side effects.  Discussed checking blood glucose.  Discussed symptoms to monitor for and to notify me immediately if persistent or worsening.  Follow up with Ophthalmology/Optometry and Podiatry.    
Opt out
yes...
